# Patient Record
Sex: FEMALE | Race: WHITE | Employment: OTHER | URBAN - METROPOLITAN AREA
[De-identification: names, ages, dates, MRNs, and addresses within clinical notes are randomized per-mention and may not be internally consistent; named-entity substitution may affect disease eponyms.]

---

## 2017-01-20 PROBLEM — K21.9 LPRD (LARYNGOPHARYNGEAL REFLUX DISEASE): Status: ACTIVE | Noted: 2017-01-20

## 2017-01-20 PROBLEM — R19.8 GLOBUS PHARYNGEUS: Status: ACTIVE | Noted: 2017-01-20

## 2017-03-29 PROBLEM — K21.9 LPRD (LARYNGOPHARYNGEAL REFLUX DISEASE): Status: RESOLVED | Noted: 2017-01-20 | Resolved: 2017-03-29

## 2017-03-29 PROBLEM — R19.8 GLOBUS PHARYNGEUS: Status: RESOLVED | Noted: 2017-01-20 | Resolved: 2017-03-29

## 2017-04-05 PROCEDURE — 82570 ASSAY OF URINE CREATININE: CPT | Performed by: INTERNAL MEDICINE

## 2017-04-05 PROCEDURE — 82043 UR ALBUMIN QUANTITATIVE: CPT | Performed by: INTERNAL MEDICINE

## 2017-04-11 ENCOUNTER — APPOINTMENT (OUTPATIENT)
Dept: CT IMAGING | Age: 82
End: 2017-04-11
Attending: EMERGENCY MEDICINE
Payer: MEDICARE

## 2017-04-11 ENCOUNTER — HOSPITAL ENCOUNTER (EMERGENCY)
Age: 82
Discharge: HOME | End: 2017-04-11
Attending: EMERGENCY MEDICINE
Payer: MEDICARE

## 2017-04-11 ENCOUNTER — APPOINTMENT (OUTPATIENT)
Dept: GENERAL RADIOLOGY | Age: 82
End: 2017-04-11
Attending: EMERGENCY MEDICINE
Payer: MEDICARE

## 2017-04-11 VITALS
WEIGHT: 155 LBS | DIASTOLIC BLOOD PRESSURE: 57 MMHG | SYSTOLIC BLOOD PRESSURE: 123 MMHG | RESPIRATION RATE: 20 BRPM | BODY MASS INDEX: 29.27 KG/M2 | OXYGEN SATURATION: 100 % | TEMPERATURE: 96 F | HEART RATE: 85 BPM | HEIGHT: 61 IN

## 2017-04-11 DIAGNOSIS — S09.90XA MINOR HEAD INJURY, INITIAL ENCOUNTER: Primary | ICD-10-CM

## 2017-04-11 DIAGNOSIS — J98.01 ACUTE BRONCHOSPASM: ICD-10-CM

## 2017-04-11 PROCEDURE — 71010 XR CHEST AP PORTABLE  (CPT=71010): CPT | Performed by: RADIOLOGY

## 2017-04-11 PROCEDURE — 94640 AIRWAY INHALATION TREATMENT: CPT

## 2017-04-11 PROCEDURE — 99285 EMERGENCY DEPT VISIT HI MDM: CPT

## 2017-04-11 PROCEDURE — 80053 COMPREHEN METABOLIC PANEL: CPT | Performed by: EMERGENCY MEDICINE

## 2017-04-11 PROCEDURE — 36415 COLL VENOUS BLD VENIPUNCTURE: CPT

## 2017-04-11 PROCEDURE — 85025 COMPLETE CBC W/AUTO DIFF WBC: CPT | Performed by: EMERGENCY MEDICINE

## 2017-04-11 PROCEDURE — 93010 ELECTROCARDIOGRAM REPORT: CPT

## 2017-04-11 PROCEDURE — 93005 ELECTROCARDIOGRAM TRACING: CPT

## 2017-04-11 PROCEDURE — 70450 CT HEAD/BRAIN W/O DYE: CPT

## 2017-04-11 PROCEDURE — 84484 ASSAY OF TROPONIN QUANT: CPT | Performed by: EMERGENCY MEDICINE

## 2017-04-11 RX ORDER — ALBUTEROL SULFATE 90 UG/1
2 AEROSOL, METERED RESPIRATORY (INHALATION) EVERY 4 HOURS PRN
Qty: 1 INHALER | Refills: 0 | Status: SHIPPED | OUTPATIENT
Start: 2017-04-11 | End: 2017-04-20

## 2017-04-11 RX ORDER — IPRATROPIUM BROMIDE AND ALBUTEROL SULFATE 2.5; .5 MG/3ML; MG/3ML
3 SOLUTION RESPIRATORY (INHALATION) ONCE
Status: COMPLETED | OUTPATIENT
Start: 2017-04-11 | End: 2017-04-11

## 2017-04-12 NOTE — ED PROVIDER NOTES
Patient Seen in: THE [facility] Emergency Department In [place]    History   Patient presents with:  Head Neck Injury (neurologic, musculoskeletal)    Stated Complaint: Hit Head Sunday-PMD sent here for r/o concussion.      HPI    25-year-old female presents t OTHER SURGICAL HISTORY  8-5-2011    Comment Cysto / Flow us - Dr. Abhijit Gibson       Medications :   Albuterol Sulfate  (90 Base) MCG/ACT Inhalation Aero Soln,  Inhale 2 puffs into the lungs every 4 (four) hours as needed for Wheezing.    ESCITALOPRAM 20 Alcohol Use: Yes           0.0 oz/week       0 Standard drinks or equivalent per week       Comment: 2 beers per week      Review of Systems   All other systems reviewed and are negative.       Positive for stated complaint: Hit Head Sunday-PMD sent here ED Course     Labs Reviewed   COMP METABOLIC PANEL (14) - Abnormal; Notable for the following:     Glucose 126 (*)     All other components within normal limits   TROPONIN I - Normal   CBC WITH DIFFERENTIAL WITH PLATELET    Narrative:      The foll Schedule an appointment as soon as possible for a visit        Medications Prescribed:  Current Discharge Medication List    START taking these medications    ! !  Albuterol Sulfate  (90 Base) MCG/ACT Inhalation Aero Soln  Inhale 2 puffs into the lung

## 2017-10-13 PROCEDURE — 82570 ASSAY OF URINE CREATININE: CPT | Performed by: INTERNAL MEDICINE

## 2017-10-13 PROCEDURE — 82043 UR ALBUMIN QUANTITATIVE: CPT | Performed by: INTERNAL MEDICINE

## 2017-11-18 ENCOUNTER — HOSPITAL ENCOUNTER (OUTPATIENT)
Age: 82
Discharge: HOME | End: 2017-11-18
Attending: FAMILY MEDICINE
Payer: MEDICARE

## 2017-11-18 VITALS
OXYGEN SATURATION: 95 % | HEART RATE: 83 BPM | DIASTOLIC BLOOD PRESSURE: 82 MMHG | TEMPERATURE: 99 F | SYSTOLIC BLOOD PRESSURE: 117 MMHG | RESPIRATION RATE: 20 BRPM

## 2017-11-18 DIAGNOSIS — J01.00 ACUTE NON-RECURRENT MAXILLARY SINUSITIS: Primary | ICD-10-CM

## 2017-11-18 PROCEDURE — 99213 OFFICE O/P EST LOW 20 MIN: CPT

## 2017-11-18 PROCEDURE — 99214 OFFICE O/P EST MOD 30 MIN: CPT

## 2017-11-18 RX ORDER — AZELASTINE HCL 205.5 UG/1
SPRAY NASAL
Qty: 1 EACH | Refills: 0 | Status: SHIPPED | OUTPATIENT
Start: 2017-11-18 | End: 2018-03-16

## 2017-11-18 RX ORDER — CEFDINIR 300 MG/1
300 CAPSULE ORAL 2 TIMES DAILY
Qty: 20 CAPSULE | Refills: 0 | Status: SHIPPED | OUTPATIENT
Start: 2017-11-18 | End: 2018-02-14

## 2017-11-18 NOTE — ED PROVIDER NOTES
Patient Seen in: THE [facility]mediate Care In [facility]    History   Patient presents with:  Sinus Problem    Stated Complaint: SINUS INFECTION X 1 WK    HPI    This 51-year-old female presents to the office with a two-week history of worsening sinus conge negative except as noted above.     Physical Exam   ED Triage Vitals [11/18/17 1501]  BP: 117/82  Pulse: 83  Resp: 20  Temp: 98.6 °F (37 °C)  Temp src: Oral  SpO2: 95 %  O2 Device: None (Room air)    Current:/82   Pulse 83   Temp 98.6 °F (37 °C) (Oral aware of patient's Amoxil allergy.   Associated Diagnoses:Acute non-recurrent maxillary sinusitis    Azelastine HCl (ASTEPRO) 0.15 % Nasal Solution  Use 2 sprays each nostril once daily after shower for nasal/sinus congestion  Qty: 1 each Refills: 0  Associ

## 2017-11-18 NOTE — ED INITIAL ASSESSMENT (HPI)
Pt c/o thick post nasal drip \"That I can cut with my teeth\"  C/o sore throat from drainage. Clearing throat. Some intermittent plugged ears.

## 2018-02-14 PROBLEM — J41.0 SIMPLE CHRONIC BRONCHITIS (HCC): Status: ACTIVE | Noted: 2018-02-14

## 2018-03-16 ENCOUNTER — HOSPITAL ENCOUNTER (OUTPATIENT)
Age: 83
Discharge: HOME | End: 2018-03-16
Attending: FAMILY MEDICINE
Payer: MEDICARE

## 2018-03-16 VITALS
HEART RATE: 79 BPM | SYSTOLIC BLOOD PRESSURE: 131 MMHG | DIASTOLIC BLOOD PRESSURE: 69 MMHG | TEMPERATURE: 98 F | RESPIRATION RATE: 20 BRPM | OXYGEN SATURATION: 96 %

## 2018-03-16 DIAGNOSIS — J31.0 CHRONIC RHINITIS: ICD-10-CM

## 2018-03-16 DIAGNOSIS — R05.3 CHRONIC COUGH: Primary | ICD-10-CM

## 2018-03-16 PROCEDURE — 99213 OFFICE O/P EST LOW 20 MIN: CPT

## 2018-03-16 PROCEDURE — 99214 OFFICE O/P EST MOD 30 MIN: CPT

## 2018-03-16 RX ORDER — FLUTICASONE PROPIONATE 50 MCG
SPRAY, SUSPENSION (ML) NASAL
Qty: 1 INHALER | Refills: 0 | Status: SHIPPED | OUTPATIENT
Start: 2018-03-16 | End: 2018-06-05

## 2018-03-16 RX ORDER — AZELASTINE HCL 205.5 UG/1
SPRAY NASAL
Qty: 1 EACH | Refills: 0 | Status: SHIPPED | OUTPATIENT
Start: 2018-03-16 | End: 2018-06-21

## 2018-03-16 RX ORDER — BENZONATATE 200 MG/1
200 CAPSULE ORAL 3 TIMES DAILY PRN
Qty: 30 CAPSULE | Refills: 0 | Status: SHIPPED | OUTPATIENT
Start: 2018-03-16 | End: 2018-06-07 | Stop reason: ALTCHOICE

## 2018-03-16 NOTE — ED INITIAL ASSESSMENT (HPI)
Cough-   Pt has chronic cough. Pt saw and  ENT  Pt was on omeprazole and   PCP  changed to zantac  on feb 14 pr was prescribed with. Coughing  All night and wheezing . Pt has been taking her inhaler, yesterday, but none  Today.

## 2018-03-16 NOTE — ED PROVIDER NOTES
Patient Seen in: [facility] In [facility]    History   Patient presents with:  Cough    Stated Complaint: stomach,throat & sinus 3 wks    HPI    This 63-year-old female is brought to the office by her friend for evaluation of chronic sinus co incontinence        Past Surgical History:  No date: CHOLECYSTECTOMY  2008: COLONOSCOPY  No date: HYSTERECTOMY  No date: OTHER SURGICAL HISTORY      Comment: bladder raised  8-5-2011: OTHER SURGICAL HISTORY      Comment: Cysto / Flow us - Dr. Dafne Mercado  No d 1436  ------------------------------------------------------------       MDM     Extensive discussion with the patient and her friend regarding causes a cough.   I suspect that the patient's cough is multifactorial.  She has evidence of chronic postnasal dr the Flonase which is fluticasone nasal spray 2 sprays to each nostril once daily after shower. Restart the Astepro which is is a lasting nasal spray 2 sprays once daily. If you use your Flonase in the morning than take the Astepro at nighttime.   Take the

## 2018-07-24 ENCOUNTER — HOSPITAL ENCOUNTER (OUTPATIENT)
Age: 83
Discharge: HOME | End: 2018-07-24
Attending: FAMILY MEDICINE
Payer: MEDICARE

## 2018-07-24 VITALS
WEIGHT: 160 LBS | HEIGHT: 63 IN | TEMPERATURE: 98 F | RESPIRATION RATE: 20 BRPM | DIASTOLIC BLOOD PRESSURE: 74 MMHG | BODY MASS INDEX: 28.35 KG/M2 | SYSTOLIC BLOOD PRESSURE: 152 MMHG | HEART RATE: 72 BPM | OXYGEN SATURATION: 98 %

## 2018-07-24 DIAGNOSIS — J98.01 ACUTE BRONCHOSPASM: Primary | ICD-10-CM

## 2018-07-24 PROCEDURE — 99214 OFFICE O/P EST MOD 30 MIN: CPT

## 2018-07-24 PROCEDURE — 94640 AIRWAY INHALATION TREATMENT: CPT

## 2018-07-24 RX ORDER — IPRATROPIUM BROMIDE AND ALBUTEROL SULFATE 2.5; .5 MG/3ML; MG/3ML
3 SOLUTION RESPIRATORY (INHALATION) ONCE
Status: COMPLETED | OUTPATIENT
Start: 2018-07-24 | End: 2018-07-24

## 2018-07-24 NOTE — ED INITIAL ASSESSMENT (HPI)
Wheezing- x 2 days. Pt used inhaler the day before x1 . Pt woke up wheezing. Pt has chronic cough with phlegm . Took zantac this morning. denies fever.  Pt has h/o copd

## 2018-07-24 NOTE — ED PROVIDER NOTES
Patient Seen in: THE [facility]mediate Care In [facility]    History   Patient presents with:  Wheezing    Stated Complaint: Wheezing    HPI    This 49-year-old female presents to the office with complaint of wheezing.   The patient has had issues with bronch oz/week     Comment: 2 beers per week      Review of Systems    Positive for stated complaint: Wheezing  Other systems are as noted in HPI. Constitutional and vital signs reviewed. All other systems reviewed and negative except as noted above.     Matt worsening symptoms.         Disposition and Plan     Clinical Impression:  Acute bronchospasm  (primary encounter diagnosis)    Disposition:  Discharge  7/24/2018  1:54 pm    Follow-up:  Yong Can MD  [address] [place] [address]

## 2018-11-08 ENCOUNTER — CHARTING TRANS (OUTPATIENT)
Dept: OTHER | Age: 83
End: 2018-11-08

## 2018-11-08 PROBLEM — K21.9 LARYNGOPHARYNGEAL REFLUX (LPR): Status: ACTIVE | Noted: 2017-01-20

## 2018-11-08 PROBLEM — R09.82 POST-NASAL DRAINAGE: Status: ACTIVE | Noted: 2018-11-08

## 2018-12-12 PROCEDURE — 82043 UR ALBUMIN QUANTITATIVE: CPT | Performed by: INTERNAL MEDICINE

## 2018-12-12 PROCEDURE — 82570 ASSAY OF URINE CREATININE: CPT | Performed by: INTERNAL MEDICINE

## 2019-01-04 ENCOUNTER — HOSPITAL ENCOUNTER (OUTPATIENT)
Age: 84
Discharge: HOME | End: 2019-01-04
Attending: EMERGENCY MEDICINE
Payer: MEDICARE

## 2019-01-04 VITALS
SYSTOLIC BLOOD PRESSURE: 149 MMHG | TEMPERATURE: 97 F | RESPIRATION RATE: 21 BRPM | OXYGEN SATURATION: 95 % | DIASTOLIC BLOOD PRESSURE: 92 MMHG | HEART RATE: 93 BPM

## 2019-01-04 DIAGNOSIS — J20.9 ACUTE BRONCHITIS, UNSPECIFIED ORGANISM: Primary | ICD-10-CM

## 2019-01-04 PROCEDURE — 99214 OFFICE O/P EST MOD 30 MIN: CPT

## 2019-01-04 PROCEDURE — 99213 OFFICE O/P EST LOW 20 MIN: CPT

## 2019-01-04 RX ORDER — AZITHROMYCIN 250 MG/1
TABLET, FILM COATED ORAL
Qty: 1 PACKAGE | Refills: 0 | Status: SHIPPED | OUTPATIENT
Start: 2019-01-04 | End: 2019-01-10

## 2019-01-04 NOTE — ED INITIAL ASSESSMENT (HPI)
Pt here c/o cough for last week, states she has been wheezing for close to a week. Used inhaler last night. Denies fever.

## 2019-01-04 NOTE — ED PROVIDER NOTES
Patient Seen in: THE [facility] In [facility]    History   Patient presents with:  Cough/URI    Stated Complaint: wheezing,cough     HPI    This is an 42-year-old white female complaining of cough the patient's had a cough with some mild shortnes BP (!) 149/92   Pulse 93   Resp 21   Temp 97.3 °F (36.3 °C)   Temp src Temporal   SpO2 95 %   O2 Device None (Room air)       Current:BP (!) 149/92   Pulse 93   Temp 97.3 °F (36.3 °C) (Temporal)   Resp 21   SpO2 95%         Physical Exam    Patient is al

## 2019-02-05 ENCOUNTER — TELEPHONE (OUTPATIENT)
Dept: SCHEDULING | Age: 84
End: 2019-02-05

## 2019-03-05 PROCEDURE — 88377 M/PHMTRC ALYS ISHQUANT/SEMIQ: CPT | Performed by: RADIOLOGY

## 2019-03-05 PROCEDURE — 88360 TUMOR IMMUNOHISTOCHEM/MANUAL: CPT | Performed by: RADIOLOGY

## 2019-03-05 PROCEDURE — 88305 TISSUE EXAM BY PATHOLOGIST: CPT | Performed by: RADIOLOGY

## 2019-03-14 PROBLEM — C50.411 MALIGNANT NEOPLASM OF UPPER-OUTER QUADRANT OF RIGHT BREAST IN FEMALE, ESTROGEN RECEPTOR POSITIVE (HCC): Status: ACTIVE | Noted: 2019-03-14

## 2019-03-14 PROBLEM — Z17.0 MALIGNANT NEOPLASM OF UPPER-OUTER QUADRANT OF RIGHT BREAST IN FEMALE, ESTROGEN RECEPTOR POSITIVE (HCC): Status: ACTIVE | Noted: 2019-03-14

## 2019-03-30 ENCOUNTER — APPOINTMENT (OUTPATIENT)
Dept: GENERAL RADIOLOGY | Age: 84
End: 2019-03-30
Attending: PHYSICIAN ASSISTANT
Payer: MEDICARE

## 2019-03-30 ENCOUNTER — HOSPITAL ENCOUNTER (OUTPATIENT)
Age: 84
Discharge: HOME | End: 2019-03-30
Payer: MEDICARE

## 2019-03-30 VITALS
RESPIRATION RATE: 14 BRPM | OXYGEN SATURATION: 97 % | TEMPERATURE: 97 F | WEIGHT: 160 LBS | HEART RATE: 75 BPM | SYSTOLIC BLOOD PRESSURE: 135 MMHG | DIASTOLIC BLOOD PRESSURE: 71 MMHG | BODY MASS INDEX: 30 KG/M2

## 2019-03-30 DIAGNOSIS — J01.00 ACUTE NON-RECURRENT MAXILLARY SINUSITIS: Primary | ICD-10-CM

## 2019-03-30 DIAGNOSIS — J20.9 ACUTE BRONCHITIS, UNSPECIFIED ORGANISM: ICD-10-CM

## 2019-03-30 PROCEDURE — 71046 X-RAY EXAM CHEST 2 VIEWS: CPT | Performed by: PHYSICIAN ASSISTANT

## 2019-03-30 PROCEDURE — 99214 OFFICE O/P EST MOD 30 MIN: CPT

## 2019-03-30 PROCEDURE — 99213 OFFICE O/P EST LOW 20 MIN: CPT

## 2019-03-30 RX ORDER — METHYLPREDNISOLONE 4 MG/1
TABLET ORAL
Qty: 1 PACKAGE | Refills: 0 | Status: SHIPPED | OUTPATIENT
Start: 2019-03-30 | End: 2019-05-07

## 2019-03-30 RX ORDER — BENZONATATE 100 MG/1
100 CAPSULE ORAL 3 TIMES DAILY PRN
Qty: 30 CAPSULE | Refills: 0 | Status: SHIPPED | OUTPATIENT
Start: 2019-03-30 | End: 2019-04-29

## 2019-03-30 RX ORDER — AZITHROMYCIN 250 MG/1
TABLET, FILM COATED ORAL
Qty: 1 PACKAGE | Refills: 0 | Status: SHIPPED | OUTPATIENT
Start: 2019-03-30 | End: 2019-04-04

## 2019-03-30 RX ORDER — ALBUTEROL SULFATE 90 UG/1
2 AEROSOL, METERED RESPIRATORY (INHALATION) EVERY 4 HOURS PRN
Qty: 1 INHALER | Refills: 0 | Status: SHIPPED | OUTPATIENT
Start: 2019-03-30 | End: 2019-04-29

## 2019-03-30 NOTE — ED INITIAL ASSESSMENT (HPI)
Patient reports she doesn't feel well. Patient reports she is coughing up clear sticky phlegm. Patient reports she has been coughing for about 1 week, but it tends to come and go. Patient reports she has been here before for this same complaint.

## 2019-03-30 NOTE — ED PROVIDER NOTES
Patient Seen in: [facility] In [place]    History   Patient presents with:  Cough/URI    Stated Complaint: COUGH/SHORT OF BREATH    HPI  Jefferson Franco is an 15-year-old female who presents today for evaluation of cough, congestion and shortness of OVER 10 YRS AGO    SLING   • TOTAL ABDOM HYSTERECTOMY         Family history reviewed and is not pertinent to presenting problem.     Social History    Tobacco Use      Smoking status: Never Smoker      Smokeless tobacco: Never Used      Tobacco comment: Pe person, place, and time. No cranial nerve deficit. Skin: She is not diaphoretic. Nursing note and vitals reviewed.                                                                                                  ED Course   Labs Reviewed - No data to [employer] Additional verbal discharge instructions are given and discussed. Discharge medications are discussed. The patient is in good condition throughout the visit today and remains so upon discharge.   I discuss the plan of care with the patient, who expresses u

## 2019-04-04 PROCEDURE — 36415 COLL VENOUS BLD VENIPUNCTURE: CPT | Performed by: INTERNAL MEDICINE

## 2019-04-04 PROCEDURE — 88184 FLOWCYTOMETRY/ TC 1 MARKER: CPT | Performed by: INTERNAL MEDICINE

## 2019-04-04 PROCEDURE — 88307 TISSUE EXAM BY PATHOLOGIST: CPT | Performed by: INTERNAL MEDICINE

## 2019-04-04 PROCEDURE — 88185 FLOWCYTOMETRY/TC ADD-ON: CPT | Performed by: INTERNAL MEDICINE

## 2019-04-04 PROCEDURE — 88342 IMHCHEM/IMCYTCHM 1ST ANTB: CPT | Performed by: INTERNAL MEDICINE

## 2019-04-04 PROCEDURE — 88321 CONSLTJ&REPRT SLD PREP ELSWR: CPT | Performed by: INTERNAL MEDICINE

## 2019-04-04 PROCEDURE — 88341 IMHCHEM/IMCYTCHM EA ADD ANTB: CPT | Performed by: INTERNAL MEDICINE

## 2019-07-28 ENCOUNTER — HOSPITAL ENCOUNTER (OUTPATIENT)
Age: 84
Discharge: HOME | End: 2019-07-28
Attending: FAMILY MEDICINE
Payer: MEDICARE

## 2019-07-28 VITALS
DIASTOLIC BLOOD PRESSURE: 73 MMHG | HEART RATE: 92 BPM | OXYGEN SATURATION: 96 % | TEMPERATURE: 98 F | RESPIRATION RATE: 16 BRPM | SYSTOLIC BLOOD PRESSURE: 145 MMHG

## 2019-07-28 DIAGNOSIS — J30.2 SEASONAL ALLERGIC RHINITIS, UNSPECIFIED TRIGGER: Primary | ICD-10-CM

## 2019-07-28 PROCEDURE — 99213 OFFICE O/P EST LOW 20 MIN: CPT

## 2019-07-28 PROCEDURE — 99212 OFFICE O/P EST SF 10 MIN: CPT

## 2019-07-28 RX ORDER — FLUTICASONE PROPIONATE 50 MCG
SPRAY, SUSPENSION (ML) NASAL
Qty: 1 INHALER | Refills: 0 | Status: SHIPPED | OUTPATIENT
Start: 2019-07-28 | End: 2020-02-05

## 2019-07-28 NOTE — ED PROVIDER NOTES
Patient Seen in: [facility]    History   Patient presents with:  Cough/URI    Stated Complaint: cough    HPI    This 29-year-old female presents the office with complaint of persistent cough, postnasal drip.   She is coughing up Drug use: Yes      Review of Systems    Positive for stated complaint: cough  Other systems are as noted in HPI. Constitutional and vital signs reviewed. All other systems reviewed and negative except as noted above.     Physical Exam     ED Triage Vi have the nursing staff contact the  at the hospital who will contact the  affiliated at the cancer center so that she can make contact with the patient to help provide resources.     Disposition and Plan     Clinical Impression:  Se

## 2019-07-28 NOTE — ED INITIAL ASSESSMENT (HPI)
Presents with complains of a \" clear thick mucous\". Denies fever. Denies dyspnea. Patient complains of nasal congestion and post nasal drip. Color pink. Ski warm and dry.

## 2019-08-23 PROCEDURE — 87086 URINE CULTURE/COLONY COUNT: CPT | Performed by: PHYSICIAN ASSISTANT

## 2019-08-23 PROCEDURE — 87147 CULTURE TYPE IMMUNOLOGIC: CPT | Performed by: PHYSICIAN ASSISTANT

## 2020-01-02 PROBLEM — F32.A MILD DEPRESSION: Status: ACTIVE | Noted: 2020-01-02

## 2020-01-02 PROBLEM — Z91.09 ENVIRONMENTAL ALLERGIES: Status: ACTIVE | Noted: 2020-01-02

## 2020-01-02 PROBLEM — R53.81 PHYSICAL DEBILITY: Status: ACTIVE | Noted: 2020-01-02

## 2020-01-02 PROBLEM — R09.82 PND (POST-NASAL DRIP): Status: ACTIVE | Noted: 2018-11-08

## 2020-01-26 ENCOUNTER — HOSPITAL ENCOUNTER (OUTPATIENT)
Age: 85
Discharge: HOME | End: 2020-01-26
Attending: FAMILY MEDICINE
Payer: MEDICARE

## 2020-01-26 VITALS
OXYGEN SATURATION: 95 % | RESPIRATION RATE: 18 BRPM | HEART RATE: 96 BPM | DIASTOLIC BLOOD PRESSURE: 75 MMHG | SYSTOLIC BLOOD PRESSURE: 162 MMHG | TEMPERATURE: 98 F

## 2020-01-26 DIAGNOSIS — J34.89 SINUS DRAINAGE: Primary | ICD-10-CM

## 2020-01-26 DIAGNOSIS — J98.01 ACUTE BRONCHOSPASM: ICD-10-CM

## 2020-01-26 PROCEDURE — 99214 OFFICE O/P EST MOD 30 MIN: CPT

## 2020-01-26 PROCEDURE — 94640 AIRWAY INHALATION TREATMENT: CPT

## 2020-01-26 RX ORDER — ALBUTEROL SULFATE 90 UG/1
2 AEROSOL, METERED RESPIRATORY (INHALATION) EVERY 4 HOURS PRN
Qty: 1 INHALER | Refills: 0 | Status: SHIPPED | OUTPATIENT
Start: 2020-01-26 | End: 2021-03-22 | Stop reason: ALTCHOICE

## 2020-01-26 RX ORDER — IPRATROPIUM BROMIDE 21 UG/1
2 SPRAY, METERED NASAL EVERY 12 HOURS
Qty: 1 BOTTLE | Refills: 0 | Status: SHIPPED | OUTPATIENT
Start: 2020-01-26 | End: 2020-03-08 | Stop reason: ALTCHOICE

## 2020-01-26 RX ORDER — IPRATROPIUM BROMIDE AND ALBUTEROL SULFATE 2.5; .5 MG/3ML; MG/3ML
3 SOLUTION RESPIRATORY (INHALATION) ONCE
Status: COMPLETED | OUTPATIENT
Start: 2020-01-26 | End: 2020-01-26

## 2020-01-26 NOTE — ED PROVIDER NOTES
Patient Seen in: [facility] In [facility]      History   Patient presents with:  Sinus Problem    Stated Complaint: SINUS ISSUES/STOMACH PAIN    HPI    This 30-year-old female presents the office with complaint of chronic sinus congestion, po problem. Social History    Tobacco Use      Smoking status: Never Smoker      Smokeless tobacco: Never Used      Tobacco comment: Per pt used to live with a smoker until 1995. Alcohol use:  Yes      Alcohol/week: 0.0 standard drinks      Comment: 2 be who drives her to the store. The patient no longer drives. She has questions about how she could be downsizing to a smaller place. She has a very dysfunctional relationship with her 1 daughter who lives in [place] and they fight.   The patient does no for any new or worsening symptoms.

## 2020-01-26 NOTE — CM/SW NOTE
Fact sheet and contact info sent to Gus Gutierrez at [facility]

## 2020-03-08 ENCOUNTER — APPOINTMENT (OUTPATIENT)
Dept: GENERAL RADIOLOGY | Age: 85
End: 2020-03-08
Attending: EMERGENCY MEDICINE
Payer: MEDICARE

## 2020-03-08 ENCOUNTER — HOSPITAL ENCOUNTER (EMERGENCY)
Age: 85
Discharge: HOME | End: 2020-03-08
Attending: EMERGENCY MEDICINE
Payer: MEDICARE

## 2020-03-08 ENCOUNTER — APPOINTMENT (OUTPATIENT)
Dept: CT IMAGING | Age: 85
End: 2020-03-08
Attending: EMERGENCY MEDICINE
Payer: MEDICARE

## 2020-03-08 ENCOUNTER — APPOINTMENT (OUTPATIENT)
Dept: GENERAL RADIOLOGY | Age: 85
End: 2020-03-08
Attending: NURSE PRACTITIONER
Payer: MEDICARE

## 2020-03-08 ENCOUNTER — HOSPITAL ENCOUNTER (OUTPATIENT)
Age: 85
Discharge: ANOTHER HOSPITAL | End: 2020-03-08
Payer: MEDICARE

## 2020-03-08 VITALS
TEMPERATURE: 98 F | DIASTOLIC BLOOD PRESSURE: 52 MMHG | HEART RATE: 103 BPM | HEIGHT: 61 IN | WEIGHT: 161 LBS | SYSTOLIC BLOOD PRESSURE: 130 MMHG | OXYGEN SATURATION: 94 % | RESPIRATION RATE: 16 BRPM | BODY MASS INDEX: 30.4 KG/M2

## 2020-03-08 VITALS
DIASTOLIC BLOOD PRESSURE: 54 MMHG | SYSTOLIC BLOOD PRESSURE: 117 MMHG | BODY MASS INDEX: 30 KG/M2 | RESPIRATION RATE: 16 BRPM | TEMPERATURE: 98 F | OXYGEN SATURATION: 96 % | WEIGHT: 160.94 LBS | HEART RATE: 68 BPM

## 2020-03-08 DIAGNOSIS — M54.2 CERVICAL PAIN: ICD-10-CM

## 2020-03-08 DIAGNOSIS — M25.511 ACUTE SHOULDER PAIN DUE TO TRAUMA, RIGHT: ICD-10-CM

## 2020-03-08 DIAGNOSIS — G89.11 ACUTE SHOULDER PAIN DUE TO TRAUMA, RIGHT: ICD-10-CM

## 2020-03-08 DIAGNOSIS — S20.221A CONTUSION OF RIGHT SIDE OF BACK, INITIAL ENCOUNTER: ICD-10-CM

## 2020-03-08 DIAGNOSIS — S69.91XA INJURY OF RIGHT WRIST, INITIAL ENCOUNTER: ICD-10-CM

## 2020-03-08 DIAGNOSIS — M79.18 RIGHT BUTTOCK PAIN: ICD-10-CM

## 2020-03-08 DIAGNOSIS — S62.101A CLOSED FRACTURE OF RIGHT WRIST, INITIAL ENCOUNTER: Primary | ICD-10-CM

## 2020-03-08 DIAGNOSIS — S46.911A STRAIN OF RIGHT SHOULDER, INITIAL ENCOUNTER: ICD-10-CM
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
## 2020-03-08 DIAGNOSIS — S70.01XA CONTUSION OF RIGHT HIP, INITIAL ENCOUNTER: ICD-10-CM

## 2020-03-08 DIAGNOSIS — S00.03XA CONTUSION OF SCALP, INITIAL ENCOUNTER: ICD-10-CM

## 2020-03-08 PROCEDURE — 29125 APPL SHORT ARM SPLINT STATIC: CPT

## 2020-03-08 PROCEDURE — 73502 X-RAY EXAM HIP UNI 2-3 VIEWS: CPT | Performed by: EMERGENCY MEDICINE

## 2020-03-08 PROCEDURE — 72110 X-RAY EXAM L-2 SPINE 4/>VWS: CPT | Performed by: EMERGENCY MEDICINE

## 2020-03-08 PROCEDURE — 72125 CT NECK SPINE W/O DYE: CPT | Performed by: EMERGENCY MEDICINE

## 2020-03-08 PROCEDURE — 99285 EMERGENCY DEPT VISIT HI MDM: CPT

## 2020-03-08 PROCEDURE — 73110 X-RAY EXAM OF WRIST: CPT | Performed by: NURSE PRACTITIONER

## 2020-03-08 PROCEDURE — 99213 OFFICE O/P EST LOW 20 MIN: CPT

## 2020-03-08 PROCEDURE — 73030 X-RAY EXAM OF SHOULDER: CPT | Performed by: EMERGENCY MEDICINE

## 2020-03-08 PROCEDURE — 70450 CT HEAD/BRAIN W/O DYE: CPT | Performed by: EMERGENCY MEDICINE

## 2020-03-08 PROCEDURE — 73130 X-RAY EXAM OF HAND: CPT | Performed by: EMERGENCY MEDICINE

## 2020-03-08 NOTE — ED PROVIDER NOTES
Patient Seen in: [facility] In [place]      History   Patient presents with:  Fall    Stated Complaint: fall yesterday hit head denies loc     HPI    27-year-old white female who presents to the emergency room today for complaint of f Per pt used to live with a smoker until 1995. Alcohol use: Yes      Alcohol/week: 0.0 standard drinks      Comment: 2 beers per week    Drug use:  Yes             Review of Systems    Positive for stated complaint: fall yesterday hit head denies loc   Ot motion. She has no distal femur tenderness noted. Patient can dorsiflex and plantarflex the ankle without difficulty. She has a good dorsalis pedis pulse noted.   Patient has good range of motion of the right shoulder with pain to palpation along the lat change of the spine noted. A bone island projects over the right aspect of the sacrum. SOFT TISSUES:  Pelvic phleboliths noted. EFFUSION:  None visible. OTHER:  Negative. CONCLUSION:  No acute fracture.     X-ray study of the lumbar spine revealed: was good position she was neurovascular intact. The rest the x-rays are unremarkable except for some arthritis. CT scan the head and neck were negative. Patient will be discharged home to follow-up with orthopedic surgery.   She is advised ice and elevat

## 2020-03-08 NOTE — ED PROVIDER NOTES
Patient Seen in: [facility] In [facility]      History   Patient presents with:  Fall  Wrist Injury    Stated Complaint: RIGHT HAND/WRIST SPRAIN, FELL AT HOME HIT HEAD    HPI  Patient is an 60-year-old female past medical history of hypertens incontinence               Past Surgical History:   Procedure Laterality Date   • CHOLECYSTECTOMY     • COLONOSCOPY  2008   • HYSTERECTOMY     • NEEDLE BIOPSY RIGHT  03/2019    [facility]   • OTHER SURGICAL HISTORY      bladder raised   • OTHER SURGICAL HISTORY Tympanic membrane and external ear normal.      Nose: Nose normal. No nasal deformity, signs of injury or nasal tenderness. Left Nostril: No epistaxis. Mouth/Throat:      Mouth: Mucous membranes are dry. No injury. Tongue: No lesions.       P without ecchymosis or swelling    Lower extremity strength 5 out of 5 bilaterally. Lymphadenopathy:      Cervical: No cervical adenopathy. Skin:     General: Skin is warm and dry. Capillary Refill: Capillary refill takes less than 2 seconds. changes are seen at the lateral carpal articulations and at the radial carpal joint space. There is widening of the scapholunate joint space. On the oblique view, there is a subtle cortical irregularity at the anterior distal aspect of the right radius.

## 2020-03-08 NOTE — ED INITIAL ASSESSMENT (HPI)
States she was standing on a stool hanging a picture yesterday when she fell backwards landing on her right side. C/o right hip, lower back,right sided neck pain and head pain. Denies loss of consciousness.  Seen at urgent care and sent here for further hailey

## 2020-03-08 NOTE — ED INITIAL ASSESSMENT (HPI)
Pt presents today with c/o fall last night. Pt states that she was up on a stool trying to hang a clock when she fell backwards off the stool landing on her buttocks.  Pt states that she did fall backwards after landing on her buttocks and hit the back of h

## 2020-03-09 ENCOUNTER — HOSPITAL ENCOUNTER (EMERGENCY)
Facility: HOSPITAL | Age: 85
Discharge: HOME | End: 2020-03-09
Attending: EMERGENCY MEDICINE
Payer: MEDICARE

## 2020-03-09 ENCOUNTER — APPOINTMENT (OUTPATIENT)
Dept: GENERAL RADIOLOGY | Facility: HOSPITAL | Age: 85
End: 2020-03-09
Attending: EMERGENCY MEDICINE
Payer: MEDICARE

## 2020-03-09 VITALS
RESPIRATION RATE: 20 BRPM | SYSTOLIC BLOOD PRESSURE: 124 MMHG | WEIGHT: 160 LBS | BODY MASS INDEX: 30.21 KG/M2 | OXYGEN SATURATION: 93 % | HEIGHT: 61 IN | DIASTOLIC BLOOD PRESSURE: 65 MMHG | HEART RATE: 86 BPM | TEMPERATURE: 97 F

## 2020-03-09 DIAGNOSIS — S20.229A CONTUSION OF BACK, UNSPECIFIED LATERALITY, INITIAL ENCOUNTER: Primary | ICD-10-CM

## 2020-03-09 PROCEDURE — 96375 TX/PRO/DX INJ NEW DRUG ADDON: CPT | Performed by: EMERGENCY MEDICINE

## 2020-03-09 PROCEDURE — 99284 EMERGENCY DEPT VISIT MOD MDM: CPT | Performed by: EMERGENCY MEDICINE

## 2020-03-09 PROCEDURE — 72100 X-RAY EXAM L-S SPINE 2/3 VWS: CPT | Performed by: EMERGENCY MEDICINE

## 2020-03-09 PROCEDURE — 96374 THER/PROPH/DIAG INJ IV PUSH: CPT | Performed by: EMERGENCY MEDICINE

## 2020-03-09 PROCEDURE — 72170 X-RAY EXAM OF PELVIS: CPT | Performed by: EMERGENCY MEDICINE

## 2020-03-09 RX ORDER — ONDANSETRON 2 MG/ML
4 INJECTION INTRAMUSCULAR; INTRAVENOUS ONCE
Status: COMPLETED | OUTPATIENT
Start: 2020-03-09 | End: 2020-03-09

## 2020-03-09 RX ORDER — TRAMADOL HYDROCHLORIDE 50 MG/1
TABLET ORAL EVERY 6 HOURS PRN
Qty: 20 TABLET | Refills: 0 | Status: SHIPPED | OUTPATIENT
Start: 2020-03-09 | End: 2020-03-16

## 2020-03-09 RX ORDER — HYDROMORPHONE HYDROCHLORIDE 1 MG/ML
0.5 INJECTION, SOLUTION INTRAMUSCULAR; INTRAVENOUS; SUBCUTANEOUS ONCE
Status: COMPLETED | OUTPATIENT
Start: 2020-03-09 | End: 2020-03-09

## 2020-03-09 NOTE — ED NOTES
Pt denies dizziness prior to fall, daughter thinking she slid on paper on bathroom floor, c-collar in place, pt denies neck pain, pt fell back laning onto bottom, c/o low back pain, vss, a/ox2

## 2020-03-09 NOTE — ED PROVIDER NOTES
Patient Seen in: [facility] Emergency Department      History   Patient presents with:  Trauma    Stated Complaint: fall    HPI    59-year-old female comes to the emergency department today after a fall at home complaining of low back pain.   The pat Smoking status: Never Smoker      Smokeless tobacco: Never Used      Tobacco comment: Per pt used to live with a smoker until 1995. Alcohol use: Yes      Alcohol/week: 0.0 standard drinks      Comment: 2 beers per week    Drug use:  Yes             Revi process is appreciated. Dictated by: Ubaldo Dumont DO on 3/09/2020 at 3:58 PM       Finalized by: Ubaldo Dumont DO on 3/09/2020 at 4:00 PM          Lumbar spine films reveal the following findings:  CONCLUSION:    1. No acute process identified.   No

## 2020-03-09 NOTE — ED INITIAL ASSESSMENT (HPI)
Pt fell yesterday and broke wrist, pt fell again today with resulting back pain and neck pain, pt arrives via ems in c collar.

## 2021-01-21 PROBLEM — F03.90 MILD DEMENTIA (HCC): Status: ACTIVE | Noted: 2021-01-21

## 2021-02-19 PROBLEM — G30.8 ALZHEIMER'S DISEASE OF OTHER ONSET WITHOUT BEHAVIORAL DISTURBANCE: Status: ACTIVE | Noted: 2021-01-21

## 2021-02-19 PROBLEM — H90.3 SENSORINEURAL HEARING LOSS (SNHL) OF BOTH EARS: Status: ACTIVE | Noted: 2021-02-19

## 2021-02-19 PROBLEM — F02.80 ALZHEIMER'S DISEASE OF OTHER ONSET WITHOUT BEHAVIORAL DISTURBANCE: Status: ACTIVE | Noted: 2021-01-21

## 2021-03-16 ENCOUNTER — APPOINTMENT (OUTPATIENT)
Dept: GENERAL RADIOLOGY | Facility: HOSPITAL | Age: 86
End: 2021-03-16
Attending: EMERGENCY MEDICINE
Payer: MEDICARE

## 2021-03-16 ENCOUNTER — APPOINTMENT (OUTPATIENT)
Dept: CT IMAGING | Facility: HOSPITAL | Age: 86
End: 2021-03-16
Attending: EMERGENCY MEDICINE
Payer: MEDICARE

## 2021-03-16 ENCOUNTER — HOSPITAL ENCOUNTER (EMERGENCY)
Facility: HOSPITAL | Age: 86
Discharge: HOME | End: 2021-03-16
Attending: EMERGENCY MEDICINE
Payer: MEDICARE

## 2021-03-16 VITALS
SYSTOLIC BLOOD PRESSURE: 149 MMHG | DIASTOLIC BLOOD PRESSURE: 68 MMHG | TEMPERATURE: 98 F | HEART RATE: 72 BPM | RESPIRATION RATE: 24 BRPM | OXYGEN SATURATION: 93 % | WEIGHT: 165.38 LBS | BODY MASS INDEX: 31 KG/M2
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
## 2021-03-16 DIAGNOSIS — S93.402A SPRAIN OF LEFT ANKLE, UNSPECIFIED LIGAMENT, INITIAL ENCOUNTER: Primary | ICD-10-CM

## 2021-03-16 PROCEDURE — 99284 EMERGENCY DEPT VISIT MOD MDM: CPT

## 2021-03-16 PROCEDURE — 72125 CT NECK SPINE W/O DYE: CPT | Performed by: EMERGENCY MEDICINE

## 2021-03-16 PROCEDURE — 73610 X-RAY EXAM OF ANKLE: CPT | Performed by: EMERGENCY MEDICINE

## 2021-03-16 PROCEDURE — 70450 CT HEAD/BRAIN W/O DYE: CPT | Performed by: EMERGENCY MEDICINE

## 2021-03-16 NOTE — ED PROVIDER NOTES
Patient Seen in: [facility] Emergency Department      History   Patient presents with:  Leg or Foot Injury    Stated Complaint: ankle injury    HPI/Subjective:   HPI    Patient is an 80-year-old female who lives alone at home who presents after a fa SURGICAL HISTORY  8-5-2011    Cysto / Flow us - Dr. Jany Tanner   • REMOVAL GALLBLADDER     • SPECIAL SERVICE OR REPORT  OVER 10 YRS AGO    SLING   • TOTAL ABDOM HYSTERECTOMY                  Social History    Tobacco Use      Smoking status: Never Smoker Skin:     General: Skin is warm and dry. Neurological:      Mental Status: She is alert and oriented to person, place, and time.              ED Course   Labs Reviewed - No data to display       XR ANKLE (MIN 3 VIEWS), LEFT (CPT=73610)    Result Date: 3 ischemic change. There is no evidence of acute ischemia, hemorrhage or mass. SINUSES:           No sign of acute sinusitis. MASTOIDS:          No sign of acute inflammation. SKULL:             No evidence for fracture or osseous abnormality.  OTHER: foraminal stenosis. C5-C6:  There is facet hypertrophy. There is no osseous central canal or foraminal stenosis. C6-C7:  Facet hypertrophy and diffuse endplate osteophyte are noted without stenosis of central canal or foramina.  C7-T1:  No significant disc encounter diagnosis)  Fall, initial encounter    Disposition:  Discharge  3/16/2021  4:17 pm    Follow-up:  Callie Brooke MD  [address]  [phone]                Medications Prescribed:  Current Disch

## 2021-03-22 PROBLEM — C96.9: Status: ACTIVE | Noted: 2021-03-22

## 2021-04-13 ENCOUNTER — APPOINTMENT (OUTPATIENT)
Dept: CT IMAGING | Facility: HOSPITAL | Age: 86
DRG: 638 | End: 2021-04-13
Attending: EMERGENCY MEDICINE
Payer: MEDICARE

## 2021-04-13 ENCOUNTER — APPOINTMENT (OUTPATIENT)
Dept: GENERAL RADIOLOGY | Facility: HOSPITAL | Age: 86
DRG: 638 | End: 2021-04-13
Attending: EMERGENCY MEDICINE
Payer: MEDICARE

## 2021-04-13 ENCOUNTER — HOSPITAL ENCOUNTER (INPATIENT)
Facility: HOSPITAL | Age: 86
LOS: 11 days | Discharge: NURSING, REHAB OR LONG-TERM CARE | DRG: 638 | End: 2021-04-24
Attending: EMERGENCY MEDICINE | Admitting: INTERNAL MEDICINE
Payer: MEDICARE

## 2021-04-13 DIAGNOSIS — A41.9 SEPSIS SECONDARY TO UTI (HCC): ICD-10-CM

## 2021-04-13 DIAGNOSIS — Z71.89 GOALS OF CARE, COUNSELING/DISCUSSION: ICD-10-CM

## 2021-04-13 DIAGNOSIS — I10 ESSENTIAL HYPERTENSION, BENIGN: ICD-10-CM

## 2021-04-13 DIAGNOSIS — N39.0 SEPSIS SECONDARY TO UTI (HCC): ICD-10-CM

## 2021-04-13 DIAGNOSIS — M62.82 NON-TRAUMATIC RHABDOMYOLYSIS: ICD-10-CM

## 2021-04-13 DIAGNOSIS — N17.9 AKI (ACUTE KIDNEY INJURY) (HCC): ICD-10-CM

## 2021-04-13 DIAGNOSIS — D72.829 LEUKOCYTOSIS, UNSPECIFIED TYPE: ICD-10-CM

## 2021-04-13 DIAGNOSIS — E13.10 DIABETIC KETOACIDOSIS WITHOUT COMA ASSOCIATED WITH OTHER SPECIFIED DIABETES MELLITUS (HCC): Primary | ICD-10-CM

## 2021-04-13 PROBLEM — E11.10 DIABETIC ACIDOSIS WITHOUT COMA (HCC): Status: ACTIVE | Noted: 2021-04-13

## 2021-04-13 PROCEDURE — 71045 X-RAY EXAM CHEST 1 VIEW: CPT | Performed by: EMERGENCY MEDICINE

## 2021-04-13 PROCEDURE — 73523 X-RAY EXAM HIPS BI 5/> VIEWS: CPT | Performed by: EMERGENCY MEDICINE

## 2021-04-13 PROCEDURE — 73610 X-RAY EXAM OF ANKLE: CPT | Performed by: EMERGENCY MEDICINE

## 2021-04-13 PROCEDURE — 72125 CT NECK SPINE W/O DYE: CPT | Performed by: EMERGENCY MEDICINE

## 2021-04-13 PROCEDURE — 70450 CT HEAD/BRAIN W/O DYE: CPT | Performed by: EMERGENCY MEDICINE

## 2021-04-13 RX ORDER — ANASTROZOLE 1 MG/1
1 TABLET ORAL DAILY
Status: DISCONTINUED | OUTPATIENT
Start: 2021-04-13 | End: 2021-04-24

## 2021-04-13 RX ORDER — ESCITALOPRAM OXALATE 20 MG/1
20 TABLET ORAL DAILY
Status: DISCONTINUED | OUTPATIENT
Start: 2021-04-13 | End: 2021-04-24

## 2021-04-13 RX ORDER — INSULIN ASPART 100 [IU]/ML
0.2 INJECTION, SOLUTION INTRAVENOUS; SUBCUTANEOUS ONCE
Status: DISCONTINUED | OUTPATIENT
Start: 2021-04-13 | End: 2021-04-13

## 2021-04-13 RX ORDER — ANASTROZOLE 1 MG/1
1 TABLET ORAL DAILY
COMMUNITY

## 2021-04-13 RX ORDER — SODIUM CHLORIDE 9 MG/ML
INJECTION, SOLUTION INTRAVENOUS CONTINUOUS
Status: DISCONTINUED | OUTPATIENT
Start: 2021-04-13 | End: 2021-04-14

## 2021-04-13 RX ORDER — ALPRAZOLAM 1 MG/1
1 TABLET ORAL NIGHTLY PRN
Status: ON HOLD | COMMUNITY
Start: 2021-04-12 | End: 2021-06-01

## 2021-04-13 RX ORDER — ACETAMINOPHEN 325 MG/1
650 TABLET ORAL EVERY 6 HOURS PRN
Status: DISCONTINUED | OUTPATIENT
Start: 2021-04-13 | End: 2021-04-13

## 2021-04-13 RX ORDER — INSULIN ASPART 100 [IU]/ML
10 INJECTION, SOLUTION INTRAVENOUS; SUBCUTANEOUS ONCE
Status: COMPLETED | OUTPATIENT
Start: 2021-04-13 | End: 2021-04-13

## 2021-04-13 RX ORDER — ACETAMINOPHEN 325 MG/1
650 TABLET ORAL EVERY 4 HOURS PRN
Status: DISCONTINUED | OUTPATIENT
Start: 2021-04-13 | End: 2021-04-24

## 2021-04-13 RX ORDER — ACETAMINOPHEN 160 MG/5ML
650 SOLUTION ORAL EVERY 4 HOURS PRN
Status: DISCONTINUED | OUTPATIENT
Start: 2021-04-13 | End: 2021-04-24

## 2021-04-13 RX ORDER — ALPRAZOLAM 0.5 MG/1
0.5 TABLET ORAL NIGHTLY PRN
Status: DISCONTINUED | OUTPATIENT
Start: 2021-04-13 | End: 2021-04-24

## 2021-04-13 RX ORDER — DEXTROSE AND SODIUM CHLORIDE 5; .45 G/100ML; G/100ML
INJECTION, SOLUTION INTRAVENOUS CONTINUOUS
Status: DISCONTINUED | OUTPATIENT
Start: 2021-04-13 | End: 2021-04-14

## 2021-04-13 RX ORDER — SODIUM CHLORIDE 9 MG/ML
1000 INJECTION, SOLUTION INTRAVENOUS ONCE
Status: COMPLETED | OUTPATIENT
Start: 2021-04-13 | End: 2021-04-13

## 2021-04-13 RX ORDER — ESCITALOPRAM OXALATE 20 MG/1
20 TABLET ORAL DAILY
COMMUNITY

## 2021-04-13 RX ORDER — ACETAMINOPHEN 650 MG/1
650 SUPPOSITORY RECTAL EVERY 4 HOURS PRN
Status: DISCONTINUED | OUTPATIENT
Start: 2021-04-13 | End: 2021-04-24

## 2021-04-13 RX ORDER — ONDANSETRON 2 MG/ML
4 INJECTION INTRAMUSCULAR; INTRAVENOUS EVERY 6 HOURS PRN
Status: DISCONTINUED | OUTPATIENT
Start: 2021-04-13 | End: 2021-04-24

## 2021-04-13 RX ORDER — ACETAMINOPHEN 500 MG
1000 TABLET ORAL ONCE
Status: COMPLETED | OUTPATIENT
Start: 2021-04-13 | End: 2021-04-13

## 2021-04-13 RX ORDER — DONEPEZIL HYDROCHLORIDE 5 MG/1
5 TABLET, FILM COATED ORAL NIGHTLY
Status: DISCONTINUED | OUTPATIENT
Start: 2021-04-13 | End: 2021-04-24

## 2021-04-13 RX ORDER — HEPARIN SODIUM 5000 [USP'U]/ML
5000 INJECTION, SOLUTION INTRAVENOUS; SUBCUTANEOUS EVERY 8 HOURS SCHEDULED
Status: DISCONTINUED | OUTPATIENT
Start: 2021-04-13 | End: 2021-04-24

## 2021-04-13 RX ORDER — LATANOPROST 50 UG/ML
1 SOLUTION/ DROPS OPHTHALMIC NIGHTLY
Status: DISCONTINUED | OUTPATIENT
Start: 2021-04-13 | End: 2021-04-24

## 2021-04-13 RX ORDER — FLUTICASONE PROPIONATE 50 MCG
1 SPRAY, SUSPENSION (ML) NASAL
COMMUNITY

## 2021-04-13 RX ORDER — DEXTROSE MONOHYDRATE 25 G/50ML
50 INJECTION, SOLUTION INTRAVENOUS
Status: DISCONTINUED | OUTPATIENT
Start: 2021-04-13 | End: 2021-04-24

## 2021-04-13 NOTE — CONSULTS
Critical Care H&P/Consult     NAME: Luicta Craven - ROOM: A7/A7 - MRN: [id number] - Age: 80year old - :  7/10/1931    Date of Admission: 2021 11:23 AM  Admission Diagnosis: No admission diagnoses are documented for this encounter.       Assessment/ H/O chronic bronchitis    • H/O: pneumonia    • Hx of diseases NEC     GLAUCOMA AND POSS ULCER   • HYPERLIPIDEMIA    • Hyperlipidemia    • HYPERTENSION    • Lymph node disorder     left iliac LN   • Type II or unspecified type diabetes mellitus without men spray by Each Nare route daily. , Disp: , Rfl:   •  metFORMIN HCl 1000 MG Oral Tab, Take 1 tablet (1,000 mg total) by mouth daily with breakfast., Disp: 90 tablet, Rfl: 3  •  lisinopril 2.5 MG Oral Tab, Take 1 tablet (2.5 mg total) by mouth daily. , Disp: 90 HCT 45.9   MCV 95.0   MCH 30.2   MCHC 31.8   RDW 12.9   NEPRELIM 17.55*   WBC 20.9*   .0     Recent Labs   Lab 04/13/21  1225   *   BUN 16   CREATSERUM 1.64*   GFRAA 32*   GFRNAA 28*   CA 9.6   ALB 4.0   *   K 5.1   CL 95*   CO2 17.0*

## 2021-04-13 NOTE — ED PROVIDER NOTES
Patient Seen in: [facility] Emergency Department      History   Patient presents with:  Fall    Stated Complaint: fall unknown time between bed and bed rail. right leg pinned agaisnt rail, pres*    HPI/Subjective:   HPI  27-year-old female history o GALLBLADDER     • SPECIAL SERVICE OR REPORT  OVER 10 YRS AGO    SLING   • TOTAL ABDOM HYSTERECTOMY                  Social History    Tobacco Use      Smoking status: Never Smoker      Smokeless tobacco: Never Used      Tobacco comment: Per pt used to live Musculoskeletal:      Cervical back: No tenderness. Comments: Tenderness at bilateral hips although able to bend both knees up in bed, no tenderness on knees, able to range left ankle.   Right ankle pain with restricted range of motion, lateral abras components:    WBC 20.9 (*)     Neutrophil Absolute Prelim 17.55 (*)     Neutrophil Absolute 17.55 (*)     Monocyte Absolute 1.40 (*)     All other components within normal limits   TROPONIN I - Normal   RAPID SARS-COV-2 BY PCR - Normal   CBC WITH DIFFEREN PORTABLE  (CPT=71045)    Result Date: 4/13/2021  CONCLUSION:  1. Lungs are clear with hyperaeration, suspicious for emphysema. Please correlate clinically. 2. No acute cardiopulmonary disease. 3. Degenerative changes of the thoracic spine and shoulders. functions to treat single or multiple organ system failure and/or to prevent further life threatening deterioration of the patient's condition. Interventions were performed as documented above.      TOTAL CRITICAL CARE TIME ELAPSED: 120   BODY SYSTEM AT RIS

## 2021-04-13 NOTE — H&P
[facility] Hospitalist History and Physical      Patient presents with:  Fall       PCP: Wilner Mcginnis MD      History of Present Illness: Patient is a 80year old female with PMH sig for dementia, HTN, DM II, breast CA, and anxiety who presented to the SURGICAL HISTORY      bladder raised   • OTHER SURGICAL HISTORY  8-5-2011    Cysto / Flow us - Dr. Chin   • REMOVAL GALLBLADDER     • SPECIAL SERVICE OR REPORT  OVER 10 YRS AGO    SLING   • TOTAL ABDOM HYSTERECTOMY          ALL:    Iodine Tincture 92%   BMI 29.29 kg/m²   Gen: Oriented to persona, not place or time. Elderly. HEENT:  EOMI, PERRLA, OP clear, dry MM  Pulm: Lungs clear bilaterally, normal respiratory effort  CV:  Tachy, reg, no murmur. Normal PMI.     Abd: Abdomen soft, nontender, non XR ANKLE (MIN 3 VIEWS), RIGHT (CPT=73610)    Result Date: 4/13/2021  PROCEDURE:  XR ANKLE (MIN 3 VIEWS), RIGHT (CPT=73610)  TECHNIQUE:  Three views were obtained. COMPARISON:  None.   INDICATIONS:  fall unknown time between bed and bed rail. right leg CONCLUSION:  No acute process.    Dictated by (CST): Hetal Reid MD on 4/13/2021 at 2:29 PM     Finalized by (CST): Hetal Reid MD on 4/13/2021 at 2:35 PM       CT BRAIN OR HEAD (34521)    Result Date: 3/16/2021  PROCEDURE:  CT BRAIN OR HEAD (5958 reconstructions are generated. Dose reduction techniques were used. Dose information is transmitted to the [employer] [facility] [employer]) NRDR ([address]) which includes the Dose Index Registry.   PATIENT STATED HISTORY: (As tra hypertrophy are noted without stenosis of central canal or foramina. C3-C4:  There are bilateral uncovertebral joint osteophytes and there is bilateral facet hypertrophy. Findings cause moderate right and mild left foraminal stenosis.  C4-C5:  Bilateral un 1. Lungs are clear with hyperaeration, suspicious for emphysema. Please correlate clinically. 2. No acute cardiopulmonary disease. 3. Degenerative changes of the thoracic spine and shoulders.     Dictated by (CST): Nayeli Celaya DO on 4/13/2021 at 1:13 PM ED after being found immobile in bed.       # Diabetic ketoacidosis   - suspect brought on by UTI and dehydration   - admit to ICU on insulin gtt per protocol and IVFs per protocol   - pulm/CC c/s   - check BMP's per protocol to evaluate for closing of AG

## 2021-04-13 NOTE — ED INITIAL ASSESSMENT (HPI)
Fall unknown time. Pt last spoke to her friend 12-14 hours PTA. Pt was found between her bed and the bed rail with her right leg pinned against the rail, pressure ulcer noted.

## 2021-04-14 RX ORDER — SODIUM CHLORIDE, SODIUM GLUCONATE, SODIUM ACETATE, POTASSIUM CHLORIDE AND MAGNESIUM CHLORIDE 526; 502; 368; 37; 30 MG/100ML; MG/100ML; MG/100ML; MG/100ML; MG/100ML
100 INJECTION, SOLUTION INTRAVENOUS CONTINUOUS
Status: DISCONTINUED | OUTPATIENT
Start: 2021-04-14 | End: 2021-04-15

## 2021-04-14 RX ORDER — SALIVA STIMULANT COMB. NO.3
SPRAY, NON-AEROSOL (ML) MUCOUS MEMBRANE AS NEEDED
Status: DISCONTINUED | OUTPATIENT
Start: 2021-04-14 | End: 2021-04-24

## 2021-04-14 RX ORDER — MAGNESIUM SULFATE HEPTAHYDRATE 40 MG/ML
2 INJECTION, SOLUTION INTRAVENOUS ONCE
Status: COMPLETED | OUTPATIENT
Start: 2021-04-14 | End: 2021-04-14

## 2021-04-14 NOTE — PROGRESS NOTES
Oliverio Craven Patient Status:  Inpatient    7/10/1931 MRN [id number]   [facility] 4SW-A Attending Lester Lorenz,    Hosp Day # 1 PCP Pepper Kelly MD     Critical Care Progress Note      Assessment/Plan:  1.  HARISH  - Brant Supple, no adenopathy or elevation in JVP  Cardiovascular: RRR, no murmurs or extra heart sounds   Respiratory: CTAB   GI: Soft, NTND, +normoactive BS   Ext: No cyanosis, clubbing or edema     Recent Labs   Lab 04/14/21  0416   RBC 4.39   HGB 13.4   HCT 41

## 2021-04-14 NOTE — SLP NOTE
ADULT SWALLOWING EVALUATION    ASSESSMENT    ASSESSMENT/OVERALL IMPRESSION:  Patient is an 79 y/o female admitted after being found down in her room. PMHx significant for HTN, DM-2, breast cancer, and dementia.  SLP orders received to evaluate oropharyngeal Problems:    Diabetic acidosis without coma (HCC)    Non-traumatic rhabdomyolysis    Leukocytosis, unspecified type    ROSA (acute kidney injury) ([facility] [address]    Sepsis secondary to UTI [facility])      Past Medical History  Past Medical History:   Diagnosis Date   • AN Functional Limits       Voice Quality: Clear  Respiratory Status: Nasal cannula  Consistencies Trialed: Thin liquids; Nectar thick liquids;Puree; Soft solid  Method of Presentation: Staff/Clinician assistance  Patient Positioning: Upright;Midline    Oral Pha

## 2021-04-14 NOTE — PLAN OF CARE
Assumed care after RN report; pt resting in bed. Alert to self; very confused and impulsive; pulls at lines/trying to get out of bed. Febrile - ccAPN [employer] updated; rectal tylenol order received. Insulin gtt per order; IVF changed by ccAPN. VSS.  Denies estrella

## 2021-04-14 NOTE — PALLIATIVE CARE NOTE
Lizbeth Mccoy is an 80year old female found at home stuck between her bed and bed rail. She was found around 10 am in the morning, had spoken to her friend the night prior about 12.-14 hours earlier.  She has know history of DM, had not been taking metformi

## 2021-04-14 NOTE — HOME CARE LIAISON
Ptnt current with [facility] for sn/pt/ot/st  Will need a CANDE order on or before dc.   Please send Aidin on dc    Thanks  Rachael Pringle

## 2021-04-14 NOTE — PROGRESS NOTES
[facility]ist note    PCP: Kelly Duffy MD    Chief Complaint:  F/u dka, rhabdo    SUBJECTIVE:  Pt in restraints overnight due to agitation  Today, she recognizes that she was hospitalized due to her elevated glc and expresses gratitude; she 171* 172*       Recent Labs   Lab 04/13/21  1225   TROP <0.045         Meds:     • lidocaine-menthol  2 patch Transdermal Daily   • insulin detemir  15 Units Subcutaneous Daily   • Insulin Aspart Pen  1-68 Units Subcutaneous TID CC   • Insulin Aspart Pen

## 2021-04-15 PROCEDURE — 99223 1ST HOSP IP/OBS HIGH 75: CPT | Performed by: NURSE PRACTITIONER

## 2021-04-15 RX ORDER — SODIUM CHLORIDE, SODIUM LACTATE, POTASSIUM CHLORIDE, CALCIUM CHLORIDE 600; 310; 30; 20 MG/100ML; MG/100ML; MG/100ML; MG/100ML
INJECTION, SOLUTION INTRAVENOUS CONTINUOUS
Status: DISCONTINUED | OUTPATIENT
Start: 2021-04-15 | End: 2021-04-24

## 2021-04-15 RX ORDER — POTASSIUM CHLORIDE 14.9 MG/ML
20 INJECTION INTRAVENOUS ONCE
Status: COMPLETED | OUTPATIENT
Start: 2021-04-15 | End: 2021-04-15

## 2021-04-15 NOTE — SLP NOTE
SPEECH DAILY NOTE - INPATIENT    ASSESSMENT & PLAN   ASSESSMENT  Pt seen for dysphagia tx to assess tolerance with recommended diet, ensure proper utilization of aspiration precautions and provide pt/family education.   Clinical swallow evaluation completed aspiration with 90 % accuracy over 1-2 session(s). In Progress   Goal #3 The patient/family/caregiver will demonstrate understanding and implementation of aspiration precautions and swallow strategies independently over 1-2 session(s).   In Progress

## 2021-04-15 NOTE — CONSULTS
[facility]  Report of Consultation    Apollo Craven Patient Status:  Inpatient    7/10/1931 MRN [id number]   [facility] 4SW-A Attending Miguelina Miller MD   Hosp Day # 2 PCP Nina Mccarthy MD     ADMIT DATE AND TIME: 2021 1 03/2019    IDC   • OTHER SURGICAL HISTORY      bladder raised   • OTHER SURGICAL HISTORY  8-5-2011    Cysto / Flow us - Dr. Ernie Chavez   • REMOVAL GALLBLADDER     • SPECIAL SERVICE OR REPORT  OVER 10 YRS AGO    SLING   • TOTAL ABDOM HYSTERECTOMY       Family g, Oral, Q15 Min PRN **OR** Glucose-Vitamin C (DEX-4) chewable tab 4 tablet, 4 tablet, Oral, Q15 Min PRN **OR** dextrose 50 % injection 50 mL, 50 mL, Intravenous, Q15 Min PRN **OR** glucose (DEX4) oral liquid 30 g, 30 g, Oral, Q15 Min PRN **OR** Glucose-Vi 30.4   MCHC 31.8 32.5 33.8   RDW 12.9 13.0 12.9   NEPRELIM 17.55* 8.38* 6.13   WBC 20.9* 12.4* 8.7   .0 152.0 161.0       Recent Labs   Lab 04/13/21  1225 04/13/21 2005 04/14/21  0416 04/14/21  0859 04/15/21  0422   *   < > 160* 171* 103* 1:13 PM     Finalized by (CST): Bina De Leon DO on 4/13/2021 at 1:14 PM       XR HIP W OR WO PELVIS(MIN 5 VIEWS),BILAT(CPT=73523)    Result Date: 4/13/2021  CONCLUSION:  1. No acute process.  2. 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 Last CT was in Fall and she had good control of the cancer.  She

## 2021-04-15 NOTE — CM/SW NOTE
FRANCISCO JAVIER order for caregiver resources. Pt is currently in ICU. FRANCISCO JAVIER s/w pt's daughter Malika Holman. Pt lives alone, has caregiver MWF from 9-12pm. Pt is supposed to ambulate with her walker but dtr reporting that pt is \"stubborn\" and does not like to use it.  Pt will u

## 2021-04-15 NOTE — PLAN OF CARE
Assumed care of pt at 1930  Pt A&Ox2 to person and place. Disoriented to time and situation. Pt forgetful and confused. Bilateral wrist restraints remain on, pt trying to take off Ivs. On RA no cough noted. No BM overnight. Clement intact with adequate UO.  D

## 2021-04-15 NOTE — DIETARY NOTE
[facility]    NUTRITION ASSESSMENT    Pt does not meet malnutrition criteria. NUTRITION INTERVENTION:    1. RD nutrition Care Plan- See RD nutrition assessment for additional recommendations   2. Meal and Snacks - monitor patient po intake.  Ann Dalton Packets     Percent Meals Eaten (last 3 days)     Date/Time Percent Meals Eaten (%)    04/14/21 1600  20 %            FOOD/NUTRITION RELATED HISTORY:   Appetite: Poor  Intake: 20% x 1 Meal  Intake Meeting Needs: No, but supplements to maximize  Food Allerg

## 2021-04-15 NOTE — PROGRESS NOTES
[facility] Hospitalist note    PCP: Will Elias MD    Chief Complaint:  F/u dka, rhabdo    SUBJECTIVE:  Doing okay. Somewhat alert but eyes closed in bed. Answered some orientation questions appropriately for staff earlier.      OBJECTIVE:  Temp:  [9 11  --   --   --   --  7  --    CREATSERUM 0.98  --  0.92  --  0.84  --  0.75  --   --   --   --  0.55  --    CA 8.9  --  8.5  --  8.6  --  8.5  --   --   --   --  8.0*  --    MG 1.8   < > 1.6   < > 2.5   < > 2.4   < > 2.3 2.2 2.1 2.2 2.2   PHOS 2.7   < > --> transitioned to subcutaneous insulin with adequate glc control  - pt was only on metformin as outpatient but need to clarify if she was taking this.   - cont empiric rocephin for UTI, IVF for rhabdo     # UTI  - cont empiric rocephin   - f/u UCx  - wbc

## 2021-04-15 NOTE — PHYSICAL THERAPY NOTE
PHYSICAL THERAPY EVALUATION - INPATIENT     Room Number: 453/453-A  Evaluation Date: 4/15/2021  Type of Evaluation: Initial  Physician Order: PT Eval and Treat    Presenting Problem: fall, DKA  Reason for Therapy: Mobility Dysfunction and Discharge P SLING   • TOTAL ABDOM HYSTERECTOMY         HOME SITUATION  Type of Home: House   Home Layout: Two level; Able to live on main level                Lives With: Alone;Caregiver part-time (CG 3x/wk for IADLs)  Drives: No  Patient Owned Equipment: Rolling Delmus Bring BASIC MOBILITY  How much difficulty does the patient currently have. ..  -   Turning over in bed (including adjusting bedclothes, sheets and blankets)?: A Lot   -   Sitting down on and standing up from a chair with arms (e.g., wheelchair, bedside commode, e set    ASSESSMENT   Patient is a 80year old female admitted on 4/13/2021 fall at home. Pt presenting with UTI and DKA. Head and C/S CT (-). Pertinent comorbidities and personal factors impacting therapy include Alzheimer's dementia, breast CA, HTN.  In thi donned by therapist: gloves, goggles, surgical mask

## 2021-04-15 NOTE — CONSULTS
[address] Craven  [id number]  Hospital Day #2  Date of Consult:   4/14/2021        Reason for Consultation:      Consult requested by Alexus COBIAN for evaluation of palliative care needs, Iliana Cloud stated as uncontrolled    • Unspecified essential hypertension    • Urinary incontinence      Past Surgical History:   Procedure Laterality Date   • CHOLECYSTECTOMY     • COLONOSCOPY  2008   • HYSTERECTOMY     • NEEDLE BIOPSY RIGHT  03/2019    IDC   • OTHE Comment:Increased heart rate  Fluconazole             RASH  Sulfa Antibiotics       HIVES    Medications:       Current Facility-Administered Medications:   •  [COMPLETED] potassium phosphate dibasic 15 mmol in sodium chloride 0.9% 250 mL IVPB, 15 mmol Oral, Daily  •  latanoprost (XALATAN) 0.005 % ophthalmic solution 1 drop, 1 drop, Both Eyes, Nightly  •  acetaminophen (TYLENOL) tab 650 mg, 650 mg, Oral, Q4H PRN **OR** acetaminophen (TYLENOL) 160 MG/5ML oral liquid 650 mg, 650 mg, Oral, Q4H PRN **OR** ac ([address]) which includes the Dose Index Registry. PATIENT STATED HISTORY: (As transcribed by Technologist)  fall         FINDINGS:  Bones are osteoporotic. The odontoid is very osteoporotic with multiple erosions.   Multilevel Dictated by (CST): Lea Durant MD on 4/13/2021 at 2:29 PM       Finalized by (CST): Lea Durant MD on 4/13/2021 at 2:35 PM     XR CHEST AP PORTABLE  (CPT=71045)  Narrative: PROCEDURE:  XR CHEST AP PORTABLE  (CPT=71045)     TECHNIQUE:  AP chest radi mild osteoarthritic changes of bilateral hips. Mild arthritic changes of the symphysis pubis and bilateral SI joints also suggested.   SOFT TISSUES:  There is a moderate degree of enthesopathy along the greater trochanters of both hips consistent with unde Review of Systems:      Palliative Care symptom needs assessed:      Dyspnea: not now, I use to be short of breath   Cough: denies  Nausea: denies  Pain: denies    Objective/Physical Exam:     Vital Signs: BP (!) 131/118   Pulse 81   Temp 99.6 °F Drowsy/confused   20 Bedbound Extensive Disease  Can’t do any work Max Assist  Total Care Minimal Drowsy/confused   10 Bedbound/coma Extensive Disease  Can’t do any work  coma  Max Assist  Total Care Mouth care Drowsy or coma   0 Death     Palliative Care is unable to make her own decisions currently she can change her code status to DNAR / DNI selective treatment. Althea Freeman would like that order in place.  Upon review of her POA forms Althea Freeman will further discuss with Wandy's PCP and neurologist to gain a signed coma associated with other specified diabetes mellitus ([facility] [address]    Diabetic acidosis without coma ([facility] [address]    Non-traumatic rhabdomyolysis    Leukocytosis, unspecified type    ROSA (acute kidney injury) ([facility] [address]    Sepsis secondary to UTI [facility])    Goals of care

## 2021-04-15 NOTE — OCCUPATIONAL THERAPY NOTE
OCCUPATIONAL THERAPY EVALUATION - INPATIENT     Room Number: 453/453-A  Evaluation Date: 4/15/2021  Type of Evaluation: Quick Eval  Presenting Problem: DKA, sepsis d/t UTI, rhabdomyolysis    Physician Order: IP Consult to Occupational Therapy  Reason for T Cysto / Flow us - Dr. Flor Srinivasan   • REMOVAL GALLBLADDER     • SPECIAL SERVICE OR REPORT  OVER 10 YRS AGO    SLING   • TOTAL ABDOM HYSTERECTOMY         OCCUPATIONAL PROFILE    HOME SITUATION  Type of Home: House  Home Layout: Two level; Able to live on main flexion: <3/4 active      Upper extremity strength is within functional limits except for the following;  Right Shoulder flexion  3/5  Left Shoulder flexion  3+/5    COORDINATION  Gross Motor    impaired   Fine Motor    impaired     ADDITIONAL TESTS female admitted on 4/13/2021 for DKA, sepsis d/t UTI, rhabdomyolysis. Complete medical history and occupational profile noted above. Functional outcome measures completed include:  The AM-CHETAN ' '6-Clicks' Inpatient Daily Activity Short Form was completed an reorientation;Patient/Family training;Patient/Family education;Equipment eval/education; Compensatory technique education  Rehab Potential : Fair  Frequency (Obs): 3-5x/week  Number of Visits to Meet Established Goals: 7       ADL GOALS:  Patient will perfo

## 2021-04-16 RX ORDER — HYDRALAZINE HYDROCHLORIDE 20 MG/ML
10 INJECTION INTRAMUSCULAR; INTRAVENOUS EVERY 4 HOURS PRN
Status: DISCONTINUED | OUTPATIENT
Start: 2021-04-16 | End: 2021-04-24

## 2021-04-16 NOTE — PROGRESS NOTES
Critical Care Progress Note     Assessment / Plan:  1. DKA  - off insulin gtt  - levemir  - SSI  2. Possible UTI - elevated PCT   - ceftriaxone  - follow-up cultures  3. Rhabdo - improved  - IVFs  4. Falls  - PT eval  5.  Breast CA  - outpatient oncology fo

## 2021-04-16 NOTE — CM/SW NOTE
Per rounds pt may be ready for discharge over the weekend. CM emailed ETHAN list to daughter Jet Buchanan. CM spoke with Jet Montanoosmar to inform her to check on list and of possible discharge this weekend pending progress. Jet Buchanan stating she will review.  CM to contact Jet Buchanan

## 2021-04-16 NOTE — SLP NOTE
SPEECH DAILY NOTE - INPATIENT    ASSESSMENT & PLAN   ASSESSMENT  Pt seen for dysphagia tx to assess tolerance with recommended diet, ensure proper utilization of aspiration precautions and provide pt/family education. Pt recently transferred out of ICU.  Pt of aspiration with 90 % accuracy over 1-2 session(s).    In Progress   Goal #3 The patient/family/caregiver will demonstrate understanding and implementation of aspiration precautions and swallow strategies independently over 1-2 session(s).   In Progress

## 2021-04-16 NOTE — OCCUPATIONAL THERAPY NOTE
OCCUPATIONAL THERAPY TREATMENT NOTE - INPATIENT     Room Number: 456/815-V  Session: 1   Number of Visits to Meet Established Goals: 7    Presenting Problem: DKA, sepsis d/t UTI, rhabdomyolysis    History related to current admission: Patient is a 79y/o fe GALLBLADDER     • SPECIAL SERVICE OR REPORT  OVER 10 YRS AGO    SLING   • TOTAL ABDOM HYSTERECTOMY         SUBJECTIVE  \"I am sorry you have to stand here. \" (re: while pt having BM)    Patient self-stated goal is n/a    OBJECTIVE  Precautions: Bed/chair a standing to sit. Sit to stand from recliner chair with max A and cueing for body mechanics  Patient transferred to commode with mod A and use of walker, cueing for sequencing  On/off commode: max A  Christie-care: max A  Patient End of Session: Up in chair; Wi

## 2021-04-16 NOTE — PHYSICAL THERAPY NOTE
Attempted to see Pt this am for PT session, however, Pt in process of transferring to med/onc. Will follow up as schedule permits.

## 2021-04-16 NOTE — PLAN OF CARE
Assumed care at 1100. Pt received alert and oriented to self. Vital signs stable, 's. MD aware, orders for PRN hydralazine for SBP > 170. PT/OT eval, recommending ETHAN. Ambulating with 2 assist and walker to chair.  Blisters and bruising to inner thig

## 2021-04-16 NOTE — PLAN OF CARE
Received pt sitting up in chair eating dinner. Requesting going back to bed. Walker used to assist.  Pt needed max assist with 3 people. Knees aminata when assisted to stand and shuffled 2 steps to sit at edge of bed.  Unable to move back onto bed, lifte

## 2021-04-16 NOTE — PROGRESS NOTES
[facility] Hospitalist note    PCP: Lauri Argueta MD    Chief Complaint:  F/u dka, rhabdo    SUBJECTIVE:  States her legs hurt, some swelling.   No sob  States she remembers me from previous visit    OBJECTIVE:  Temp:  [98.5 °F (36.9 °C)-100 °F (37.8 ° 0.75  --   --   --  0.55  --  0.51*   CA 8.5  --  8.6  --  8.5  --   --   --  8.0*  --  8.0*   MG 1.6   < > 2.5   < > 2.4   < > 2.2 2.1 2.2 2.2 2.1   PHOS 2.9   < > 2.9   < > 2.6   < > 2.1* 2.1* 2.1* 2.8 2.6   *  --  160*  --  171*  --   --   --  10 metformin on dc or at snf. Will dc carb coverage since appetite has been limited anyway. Cont ssi. - cont empiric rocephin for UTI, IVF for rhabdo.  Cultures ntd     # UTI  - cont empiric rocephin   - urine culture neg  - wbc improved     # ROSA- resolved

## 2021-04-16 NOTE — PHYSICAL THERAPY NOTE
PHYSICAL THERAPY TREATMENT NOTE - INPATIENT    Room Number: 093/558-J     Session: 1  Number of Visits to Meet Established Goals: 5    Presenting Problem: fall, DKA, UTI     History related to current admission: Pt is a 80 y.o. female admitted 4/13/21 fal HYSTERECTOMY         SUBJECTIVE  \"I might fall if I try to get up. \"     Patient’s self-stated goal is to get stronger and be independent.      OBJECTIVE  Precautions: Bed/chair alarm    WEIGHT BEARING RESTRICTION  Weight Bearing Restriction: None force generation, balance, and blocking LEs from sliding anteriorly. Pt stood statically for 45 seconds with MOD assist for balance and assisted with pericare. Pt ambulated 3ft with RW and MAX assist for balance/walker management.  Pt requires constant cuin more supportive living environment long term. DISCHARGE RECOMMENDATIONS  PT Discharge Recommendations: Sub-acute rehabilitation (ELOS 13-15 days)     PLAN  PT Treatment Plan: Bed mobility; Endurance; Energy conservation;Patient education;Gait training;Str

## 2021-04-16 NOTE — PROGRESS NOTES
Report given to [facility], patient to transfer to [phone]  All belongings with patinet

## 2021-04-16 NOTE — CONSULTS
[facility]  Report of Inpatient Wound Care Consultation    Oumar Craven Patient Status:  Inpatient    7/10/1931 MRN [id number]   [facility] 4SW-A Attending Gaviota Raya MD   Hosp Day # 3 PCP Yoana Jordan MD     Reason for Date    WBC 7.1 04/16/2021    HGB 11.2 04/16/2021    HCT 33.9 04/16/2021    .0 04/16/2021    CREATSERUM 0.51 04/16/2021    BUN 8 04/16/2021     04/16/2021    K 3.4 04/16/2021     04/16/2021    CO2 27.0 04/16/2021     04/16/2021 every other day and as needed. 3.Right medial lower leg - Cleanse with saline. Cover with bordered foam.Change every 3 days and and as needed. Apply tubigrip-from the base of the toes to below the knee area.     Thank you for allowing me to participate i

## 2021-04-17 ENCOUNTER — APPOINTMENT (OUTPATIENT)
Dept: GENERAL RADIOLOGY | Facility: HOSPITAL | Age: 86
DRG: 638 | End: 2021-04-17
Attending: HOSPITALIST
Payer: MEDICARE

## 2021-04-17 PROCEDURE — 74230 X-RAY XM SWLNG FUNCJ C+: CPT | Performed by: HOSPITALIST

## 2021-04-17 NOTE — PROGRESS NOTES
[facility] Hospitalist note    PCP: Cass Jones MD    Chief Complaint:  F/u dka, rhabdo    SUBJECTIVE:  Feels well  Eating breakfast  afebrile    OBJECTIVE:  Temp:  [98 °F (36.7 °C)-99.5 °F (37.5 °C)] 98.2 °F (36.8 °C)  Pulse:  [72-89] 86  Resp:  [1 8.0*  --  8.0* 9.0   MG 2.5   < > 2.4   < > 2.1 2.2 2.2 2.1 1.8   PHOS 2.9   < > 2.6   < > 2.1* 2.1* 2.8 2.6 3.5   *  --  171*  --   --  103*  --  105* 158*    < > = values in this interval not displayed.        Recent Labs   Lab 04/13/21  1225 04/15 given IVF for rhabdo     # UTI  - cont empiric rocephin   - urine culture neg  - wbc improved     # ROSA- resolved  - suspect due to volume depletion      # Rhabdomyolysis   - likely due to prolonged immobility   - CK down to 2K, will dc IVF as pt with some

## 2021-04-17 NOTE — PLAN OF CARE
Patient alert and oriented to self. RA. Per tele, NSR. Afebrile. Received PRN Hydralazine for SBP>170-see MAR. Incontinent, purewick in place with adequate urine output. Continuing IV fluids per MAR. Blisters and bruising to inner thighs.  Nectar thickened

## 2021-04-17 NOTE — VIDEO SWALLOW STUDY NOTE
ADULT VIDEOFLUOROSCOPIC SWALLOWING STUDY    Admission Date: 4/13/2021  Evaluation Date: 04/17/21  Radiologist: Dr. Angeles Rincon: Regular  Diet Recommendations - Liquids: Thin Liquids (small single sips).   No succes Home with support  History of Recent: Difficulty breathing  Precautions: Aspiration  Imaging results: 4/13 CXR    CONCLUSION:    1. Lungs are clear with hyperaeration, suspicious for emphysema. Please correlate clinically.    2. No acute cardiopulmonary di Penetration: None  Tracheal Aspiration: None  Penetration Aspiration Scale Score: Score 2: Material enters the airway, remains above the vocal folds, and is ejected from the airway       Overall Impression: Pt seated 90 degrees upright in SONIYA chair and vie patient/family/caregiver. Agreement/Understanding verbalized and all questions answered to their apparent satisfaction. INTERDISCIPLINARY COMMUNICATION  Reviewed results with Radiologist; agreement verbalized.     Patient Experiencing Pain: No        FO

## 2021-04-17 NOTE — PLAN OF CARE
A&Ox2, pleasantly confused, reoriented throughout the day as needed. [place]. No complaints of pain this shift. Lidocaine patch applied to mid back for chronic back pain, states she feels \"OK\" when asked.   Video swallow completed this AM- patient able to t to electrolyte replacements, including rhythm and repeat lab results as appropriate  - Fluid restriction as ordered  - Instruct patient on fluid and nutrition restrictions as appropriate  Outcome: Progressing     Problem: Impaired Cognition  Goal: Patient

## 2021-04-18 RX ORDER — LISINOPRIL 2.5 MG/1
2.5 TABLET ORAL DAILY
Status: DISCONTINUED | OUTPATIENT
Start: 2021-04-19 | End: 2021-04-20

## 2021-04-18 NOTE — PLAN OF CARE
Pt is alert and pleasantly confused. Follows commands. Reoriented as necessary. Pt denies pain. Pt tolerating regular diet and nectar thick liquids. Has remained afebrile, VSS. Spoke to daughter Davion Brown on the phone overnight.  IV in the right hand covered/w Reorient and redirection as needed  - Assess for the need to continue restraints  Outcome: Progressing

## 2021-04-18 NOTE — SLP NOTE
SPEECH DAILY NOTE - INPATIENT    ASSESSMENT & PLAN   ASSESSMENT  Pt seen for dysphagia tx to assess tolerance with recommended diet, ensure proper utilization of aspiration precautions and provide pt/family education. Patient alert and up in bed.   RN cont without overt signs or symptoms of aspiration with 90 % accuracy over 1-2 session(s).  Goal Discontinued   Goal #2 The patient will tolerate regular consistency and thin liquids without overt signs or symptoms of aspiration with 90 % accuracy over 1-2 sess

## 2021-04-18 NOTE — PROGRESS NOTES
[facility] Hospitalist Progress Note     [facility]      SUBJECTIVE:  Feeling ok today  Denies pain  CK improved    OBJECTIVE:  Temp:  [97.9 °F (36.6 °C)-98.1 °F (36.7 °C)] 97.9 °F (36.6 °C)  Pulse:  [78-88] 84  Resp:  [16-21] 20  BP: (125-170)/(43-96) 138 04/17/21  0752 04/17/21  1158 04/17/21  1631 04/17/21  2110 04/18/21  0708   PGLU 142* 146* 134* 140* 125*       Imaging:  XR ANKLE (MIN 3 VIEWS), RIGHT (CPT=73610)    Result Date: 4/13/2021  PROCEDURE:  XR ANKLE (MIN 3 VIEWS), RIGHT (CPT=73610)  TECHNIQUE intact. There is no extra cerebral collection. A fracture is not identified. The  sinuses are clear. CONCLUSION:  No acute process.    Dictated by (CST): James Villarreal MD on 4/13/2021 at 2:29 PM     Finalized by (CST): James Vilalrreal MD on 4/ (CPT=74230), 10/17/2014, 9:44 AM.  INDICATIONS:  recent AMS, underlying dementia, r/o aspiration with thin liquids  PATIENT STATED HISTORY: (As transcribed by Technologist)  Assess swallow.    CINE CAPTURES:  8 FLUORSCOPY TIME:  1minute 18seconds RADIATION 4/13/2021  PROCEDURE:  XR HIP W OR WO PELVIS (MIN 5 VIEWS), BILAT (CPT=73523)  TECHNIQUE:  Bilateral min 5 views of the hip and pelvis if performed. COMPARISON:  None.   INDICATIONS:  fall unknown time between bed and bed rail. right leg pinned against Candie Vega Transdermal, Daily  Insulin Aspart Pen (NOVOLOG) 100 UNIT/ML flexpen 2-10 Units, 2-10 Units, Subcutaneous, TID CC and HS  glucose (DEX4) oral liquid 15 g, 15 g, Oral, Q15 Min PRN   Or  Glucose-Vitamin C (DEX-4) chewable tab 4 tablet, 4 tablet, Oral, Q15 Mi IVF for rhabdo --> now stopped     # UTI  - cont empiric rocephin --> completed after dose given this afternoon  - urine culture neg  - wbc improved     # ROSA- resolved  - suspect due to volume depletion      # Rhabdomyolysis   - likely due to prolonged im

## 2021-04-18 NOTE — PLAN OF CARE
Patient alert, oriented to person, disoriented to time, place and situation,calm and cooperative. Patient denies discomfort, vital signs stable. Patient noted to be coughing when eating scrambled egg this morning, Speech Therapist notified.  Respirations no

## 2021-04-19 PROCEDURE — 99233 SBSQ HOSP IP/OBS HIGH 50: CPT | Performed by: NURSE PRACTITIONER

## 2021-04-19 NOTE — PHYSICAL THERAPY NOTE
PHYSICAL THERAPY TREATMENT NOTE - INPATIENT    Room Number: 404/073-P     Session: 2   Number of Visits to Meet Established Goals: 5    Presenting Problem: fall, DKA, UTI    Problem List  Principal Problem:    Diabetic ketoacidosis without coma associated Restriction: None                PAIN ASSESSMENT   Ratin  Location: abdomen  Management Techniques:  Activity promotion;Repositioning    BALANCE after short rest. C/o pain from BP cuff. Performed BUE therex to assess strength. Left pt in bed with bed in chair position, basin nearby. RN made aware of nausea.      THERAPEUTIC EXERCISES  Lower Extremity Heel slides     Upper Extremity  - open/kenroy

## 2021-04-19 NOTE — CM/SW NOTE
SW received an order for community palliative care. SW spoke with [facility], and they will follow up with referral. SW confirmed with [facility] that they work with [facility] for palliative care. SW to follow.

## 2021-04-19 NOTE — CM/SW NOTE
Care Progression Note:  Active Acute Medical Issue:   Diabetic ketoacidosis without coma associated with other specified diabetes mellitus ([facility] [address]     Other Contributing Medical Factors/Dx  PNA, HLD, HTN, DMII, breast CA, anxiety    Length of stay: 6  GMLOS:

## 2021-04-19 NOTE — PROGRESS NOTES
[facility] Hospitalist Progress Note     [facility]      SUBJECTIVE:  No acute events  Confused for staff    OBJECTIVE:  Temp:  [97.9 °F (36.6 °C)-99 °F (37.2 °C)] 98.6 °F (37 °C)  Pulse:  [70-85] 70  Resp:  [16-20] 16  BP: (145-168)/(51-78) 168/61    [place] 2. 6* 2.7*       Recent Labs   Lab 04/18/21  0708 04/18/21  1211 04/18/21  1709 04/18/21  2311 04/19/21  0533   PGLU 125* 155* 165* 140* 141*       Imaging:  XR ANKLE (MIN 3 VIEWS), RIGHT (CPT=73610)    Result Date: 4/13/2021  PROCEDURE:  XR ANKLE (MIN 3 VI effect. Basal cisterns are intact. There is no extra cerebral collection. A fracture is not identified. The  sinuses are clear. CONCLUSION:  No acute process.    Dictated by (CST): Dwaine Quintero MD on 4/13/2021 at 2:29 PM     Finalized by TEJA , XR, XR VIDEO SWALLOW (CPT=74230), 10/17/2014, 9:44 AM.  INDICATIONS:  recent AMS, underlying dementia, r/o aspiration with thin liquids  PATIENT STATED HISTORY: (As transcribed by Technologist)  Assess swallow.    CINE CAPTURES:  8 FLUORSCOPY TIME:  1minu VIEWS),BILAT(CPT=73523)    Result Date: 4/13/2021  PROCEDURE:  XR HIP W OR WO PELVIS (MIN 5 VIEWS), BILAT (CPT=73523)  TECHNIQUE:  Bilateral min 5 views of the hip and pelvis if performed. COMPARISON:  None.   INDICATIONS:  fall unknown time between bed an Oral, PRN  glycerin-Hypromellose- (ARTIFICAL TEARS) 0.2-0.2-1 % ophthalmic solution 1 drop, 1 drop, Both Eyes, QID PRN  lidocaine-menthol 4-1 % 2 patch, 2 patch, Transdermal, Daily  glucose (DEX4) oral liquid 15 g, 15 g, Oral, Q15 Min PRN   Or  Gluc now stopped     # UTI  - cont empiric rocephin --> completed after dose given this afternoon  - urine culture neg  - wbc improved     # ROSA- resolved  - suspect due to volume depletion      # Rhabdomyolysis   - likely due to prolonged immobility   - CK althea

## 2021-04-19 NOTE — PROGRESS NOTES
[facility] Liaison received palliative referral for community PC services. Waiting to obtain insurance (verification/authorization) prior to pursuing.          Scott Gautam  [facility] Liaison   [phone]

## 2021-04-19 NOTE — PROGRESS NOTES
[address] [facility] [address] Follow Up     Tiffany Baker  [id number]  Hospital Day #6  Date of Consult: 04/15/21  Patient seen at: [facility]     Subjective:      Patient was seen and examined without family  at the bedside.      Ignacio Abdullahi HIVES  Amoxicillin             RASH  Darvon [Bexophene]          Comment:Increased heart rate  Decongestant Advanc*        Comment:Increased heart rate  Fluconazole             RASH  Sulfa Antibiotics       HIVES    Medications:       Current Facility-Admi suppository 650 mg, 650 mg, Rectal, Q4H PRN  No current outpatient medications on file.       Labs/ imaging     Hematology:  Lab Results   Component Value Date    WBC 10.5 04/19/2021    HGB 11.9 (L) 04/19/2021    HCT 35.4 04/19/2021    .0 04/19/2021 Full Normal Full   80 Full Some disease  Normal w/effort Full Normal or  Reduced Full   70 Reduced Some disease  Can’t perform job Full Normal or   Reduced Full   60 Reduced Significant disease  Can’t perform hobby Occasional  Assist Normal or   Reduced Fu discharge. Documentation in process from providers related to decisional capacity of Alexa Gary to allow Justina Gaspar to act as the POA. 2. CODE STATUS: DNAR with selective treatment. 3. POLST: deferred due to formality of POA form.  Will continue to follow up w

## 2021-04-19 NOTE — SLP NOTE
SPEECH DAILY NOTE - INPATIENT    ASSESSMENT & PLAN   ASSESSMENT  Pt seen for dysphagia tx to assess tolerance with recommended diet, ensure proper utilization of aspiration precautions and provide pt/family education. Patient awake but confused in bed.  Sh session(s).   In Progress   Goal #4 VFSS to be completed Goal met        FOLLOW UP  Follow Up Needed (Documentation Required): Yes  SLP Follow-up Date: 04/20/21  Number of Visits to Meet Established Goals: 2    Session: 2    Prior to entering room, SLP brayan

## 2021-04-19 NOTE — PROGRESS NOTES
Residential Palliative Liaison received palliative referral for community PC services. spoke with daughter Jazmin Gomes via phone to discuss Residential PC services.  Residential PC services discussed and is agreeable  to our [facility] services upon DC to [facility]

## 2021-04-20 ENCOUNTER — APPOINTMENT (OUTPATIENT)
Dept: CT IMAGING | Facility: HOSPITAL | Age: 86
DRG: 638 | End: 2021-04-20
Attending: INTERNAL MEDICINE
Payer: MEDICARE

## 2021-04-20 PROCEDURE — 74176 CT ABD & PELVIS W/O CONTRAST: CPT | Performed by: INTERNAL MEDICINE

## 2021-04-20 PROCEDURE — 99233 SBSQ HOSP IP/OBS HIGH 50: CPT | Performed by: NURSE PRACTITIONER

## 2021-04-20 RX ORDER — METOCLOPRAMIDE HYDROCHLORIDE 5 MG/ML
5 INJECTION INTRAMUSCULAR; INTRAVENOUS EVERY 6 HOURS PRN
Status: DISCONTINUED | OUTPATIENT
Start: 2021-04-20 | End: 2021-04-24

## 2021-04-20 RX ORDER — UBIDECARENONE 75 MG
100 CAPSULE ORAL DAILY
Status: DISCONTINUED | OUTPATIENT
Start: 2021-04-20 | End: 2021-04-21

## 2021-04-20 RX ORDER — LISINOPRIL 5 MG/1
5 TABLET ORAL DAILY
Status: DISCONTINUED | OUTPATIENT
Start: 2021-04-21 | End: 2021-04-24

## 2021-04-20 RX ORDER — LISINOPRIL 2.5 MG/1
2.5 TABLET ORAL ONCE
Status: COMPLETED | OUTPATIENT
Start: 2021-04-20 | End: 2021-04-20

## 2021-04-20 RX ORDER — FAMOTIDINE 10 MG/ML
20 INJECTION, SOLUTION INTRAVENOUS DAILY
Status: DISCONTINUED | OUTPATIENT
Start: 2021-04-20 | End: 2021-04-24

## 2021-04-20 NOTE — PROGRESS NOTES
1 [facility]  Follow Up     Vidal Cardozo  [id number]  Hospital Day #7  Date of Consult: 04/15/21  Patient seen at: [facility]     Subjective:      Patient was seen  at the bedside, she was lying on her left side sleep 0.5 mg, Oral, Nightly PRN  •  anastrozole (ARIMIDEX) tab 1 mg, 1 mg, Oral, Daily  •  Donepezil HCl (ARICEPT) tab 5 mg, 5 mg, Oral, Nightly  •  escitalopram (LEXAPRO) tab 20 mg, 20 mg, Oral, Daily  •  latanoprost (XALATAN) 0.005 % ophthalmic solution 1 drop Intake Consciousness   100 Full Normal Full   Normal Full   90 Full No disease  Normal Full Normal Full   80 Full Some disease  Normal w/effort Full Normal or  Reduced Full   70 Reduced Some disease  Can’t perform job Full Normal or   Reduced Full   60 Red bed bound or like a vegetable. She has low pain tolerance and she can not take suffering. We are trying to honor her wishes. My sisters are Jehovah's witness and want natural life for her, put things in Gods hands. We do not want any treatment to bring her back. \" evaluation of potential new symptoms do not benefit the patient nor provide quality of life. Consideration for hospice support when comfort care is appropriate.      HCPOA:        Healthcare Agent Appointed: Yes  Healthcare Agent's Name: Cali Rosario

## 2021-04-20 NOTE — PHYSICAL THERAPY NOTE
PHYSICAL THERAPY TREATMENT NOTE - INPATIENT    Room Number: 232/813-A     Session: 3   Number of Visits to Meet Established Goals: 5    Presenting Problem: fall, DKA, UTI    Problem List  Principal Problem:    Diabetic ketoacidosis without coma associated Bearing Restriction: None                PAIN ASSESSMENT   Rating: Unable to rate  Location: epigastric   Management Techniques: Breathing techniques;Repositioning;Relaxation    BALANCE ,  unable to maintain stance and attempting to sit impulsively. Pt reports feeling nauseous , provided emesis basin. Pt with dry heaves. /59. Increased time seated EOB pt cued for breathing, pt states she has pain in epigastric region.    Pt refus established on 4/15/2021; goals in progress 4/20/2021

## 2021-04-20 NOTE — OCCUPATIONAL THERAPY NOTE
OCCUPATIONAL THERAPY TREATMENT NOTE - INPATIENT     Room Number: 841/919-Q  Session: 2   Number of Visits to Meet Established Goals: 7    Presenting Problem: DKA, sepsis d/t UTI, rhabdomyolysis    History related to current admission: Patient is a 79y/o fe GALLBLADDER     • SPECIAL SERVICE OR REPORT  OVER 10 YRS AGO    SLING   • TOTAL ABDOM HYSTERECTOMY         SUBJECTIVE  \"I can't believe the pain. \"  Patient self-stated goal is n/a    OBJECTIVE  Precautions: Bed/chair alarm    WEIGHT BEARING RESTRICTION Supination/pronation 10 reps   Elbow flexion/extension 10 reps   Hand pumps 10 reps     Patient End of Session: Up in chair;Needs met; With [facility] staff;Call light within reach;RN aware of session/findings; All patient questions and concerns addressed;SCDs in

## 2021-04-20 NOTE — PLAN OF CARE
Patient A&Ox 1/2. VS stable, room air and afebrile. Mild complaints of pain, tylenol given. Denies n/v, tolerating diet but poor appetite. QID accuchecks. Possible discharge today. Aspiration and fall precautions with video monitoring in place.   Cont

## 2021-04-20 NOTE — CM/SW NOTE
RN states pt is ready for discharge. FRANCISCO JAVIER spoke with [facility], and they are able to accept pt today. FRANCISCO JAVIER arranged [facility] ambulance, and let Residential palliative care know of discharge. POLST uploaded in [id number].      [facility]  [phone]

## 2021-04-20 NOTE — DIETARY NOTE
[facility]    NUTRITION ASSESSMENT    Pt does not meet malnutrition criteria at this time. NUTRITION INTERVENTION:  1. Meal and Snacks - monitor patient po intake. Encourage adequate po of appropriate diet.   2. Medical Food Supplements - Ensure HP Placed This Encounter      Carbohydrate controlled diet 1800 kcal/60 grams; Fluid Consistency: Thin Liquids ; Texture Consistency: Regular; Is Patient on Accuchecks? Yes; Is Patient on Suicide Precautions?  No     Oral Supplements: Ensure High Protein BID;

## 2021-04-20 NOTE — PROGRESS NOTES
[facility] Hospitalist Progress Note     [facility]      SUBJECTIVE:  Patient had some nausea and emesis yesterday  Having nausea this morning, also notes had abdominal pain earlier  Having regular BMs    OBJECTIVE:  Temp:  [98 °F (36.7 °C)] 98 °F (36.7 °C Recent Labs   Lab 04/19/21  1242 04/19/21  1629 04/19/21  2137 04/20/21  0723 04/20/21  1135   PGLU 166* 121* 108* 125* 131*       Imaging:  XR ANKLE (MIN 3 VIEWS), RIGHT (CPT=73610)    Result Date: 4/13/2021  PROCEDURE:  XR ANKLE (MIN 3 VIEWS), RIGH Basal cisterns are intact. There is no extra cerebral collection. A fracture is not identified. The  sinuses are clear. CONCLUSION:  No acute process.    Dictated by (CST): Bri Majano MD on 4/13/2021 at 2:29 PM     Finalized by (CST): Olga Stringer VIDEO SWALLOW (CPT=74230), 10/17/2014, 9:44 AM.  INDICATIONS:  recent AMS, underlying dementia, r/o aspiration with thin liquids  PATIENT STATED HISTORY: (As transcribed by Technologist)  Assess swallow.    CINE CAPTURES:  8 FLUORSCOPY TIME:  1minute 18seco VIEWS),BILAT(CPT=73523)    Result Date: 4/13/2021  PROCEDURE:  XR HIP W OR WO PELVIS (MIN 5 VIEWS), BILAT (CPT=73523)  TECHNIQUE:  Bilateral min 5 views of the hip and pelvis if performed. COMPARISON:  None.   INDICATIONS:  fall unknown time between bed an PRN  insulin detemir (LEVEMIR) 100 UNIT/ML flextouch 10 Units, 10 Units, Subcutaneous, Daily  lactated ringers infusion, , Intravenous, Continuous  artificial saliva substitute (BIOTENE) solution SOLN, , Oral, PRN  glycerin-Hypromellose- (ARTIFICAL bedtime. Suspect that she may be able to go back to metformin on dc or at snf. Cont ssi.  last A1c 8.8  - abx stopped -- 5 days of tx  - given IVF for rhabdo --> now stopped    # Nausea/Vomiting  - no clear etiology -- not on meds that would expect to cause

## 2021-04-20 NOTE — CM/SW NOTE
RN states pt not ready for discharge due to nausea. Plan for pt to get a CT of abdomen and labs. SW cxl'd transport and updated ETHAN. SW to follow.

## 2021-04-21 ENCOUNTER — APPOINTMENT (OUTPATIENT)
Dept: CT IMAGING | Facility: HOSPITAL | Age: 86
DRG: 638 | End: 2021-04-21
Attending: INTERNAL MEDICINE
Payer: MEDICARE

## 2021-04-21 PROCEDURE — 70450 CT HEAD/BRAIN W/O DYE: CPT | Performed by: INTERNAL MEDICINE

## 2021-04-21 NOTE — CONSULTS
Sohan Craven Patient Status:  Inpatient    7/10/1931 MRN [id number]   [facility] 4NW-A Attending Cielo Mendoza MD   Hosp Day # 8 PCP MD Hayden Garces Lacho Craven is a 80year old female for N V dry he LN   • Type II or unspecified type diabetes mellitus without mention of complication, not stated as uncontrolled    • Unspecified essential hypertension    • Urinary incontinence       Past Surgical History:   Procedure Laterality Date   • CHOLECYSTECTOMY currently tolerating po.    1.  Follow symptoms. 2.  Cont current meds diet. 3.  Reassess symptoms tomorrow. Total time spent on patient care:  32 minutes    cc: Dr. Moran ref.  provider found    The patient indicates understanding of these issues and agr

## 2021-04-21 NOTE — OCCUPATIONAL THERAPY NOTE
OCCUPATIONAL THERAPY TREATMENT NOTE - INPATIENT     Room Number: 963/613-H  Session: 3  Number of Visits to Meet Established Goals: 7    Presenting Problem: DKA, sepsis d/t UTI, rhabdomyolysis    History related to current admission: Patient is a 79y/o fem GALLBLADDER     • SPECIAL SERVICE OR REPORT  OVER 10 YRS AGO    SLING   • TOTAL ABDOM HYSTERECTOMY         SUBJECTIVE  \"I just feel really confused. I feel like I'm going to throw up. \"  Patient self-stated goal is n/a    OBJECTIVE  Precautions: Bed/chair elevated. MIN A with increased time. Once assisted into optimal sitting position EOB, able to maintain sitting balance with SBA to CGA. Facilitated participation in facial hygiene and hair brushing seated EOB, assist needed for thoroughness.    Pt tolerates education  Rehab Potential : Fair  Frequency (Obs): 3-5x/week    ongoing as of 4/21/21  ADL GOALS:  Patient will perform lower body dressing w/ min A and with adaptive equipment PRN  Patient will perform toileting with min A and with adaptive equipment PRN

## 2021-04-21 NOTE — PROGRESS NOTES
[facility] Hospitalist Progress Note     [facility]      SUBJECTIVE:  Patient had some nausea and emesis yesterday  Having nausea this morning, also notes had abdominal pain earlier  Having regular BMs    OBJECTIVE:  Temp:  [98 °F (36.7 °C)-98.1 °F (36.7 ° 04/19/21  0555 04/20/21  1318   ALT 66* 66* 61* 58* 64*   * 131* 84* 65* 58*   ALB 2.5* 2.5* 2.6* 2.7* 2.8*       Recent Labs   Lab 04/20/21  0723 04/20/21  1135 04/20/21  1758 04/20/21  2036 04/21/21  0855   PGLU 125* 131* 100* 85 127*       Imagin Mild generalized cortical atrophy with chronic subcortical microvascular disease noted. No bleed or mass effect. Basal cisterns are intact. There is no extra cerebral collection. A fracture is not identified. The  sinuses are clear.             Kate Queen of varying consistencies was administered orally with patient in lateral projection.   COMPARISON:  [facility] , XR, XR VIDEO SWALLOW (CPT=74230), 10/17/2014, 9:44 AM.  INDICATIONS:  recent AMS, underlying dementia, r/o aspiration with thin liquids  PATIENT STA Finalized by (CST): Ed Carr DO on 4/13/2021 at 1:14 PM       XR HIP W OR WO PELVIS(MIN 5 VIEWS),BILAT(CPT=73523)    Result Date: 4/13/2021  PROCEDURE:  XR HIP W OR WO PELVIS (MIN 5 VIEWS), BILAT (CPT=73523)  TECHNIQUE:  Bilateral min 5 views of the h 10 mg, 10 mg, Intravenous, Q4H PRN  insulin detemir (LEVEMIR) 100 UNIT/ML flextouch 10 Units, 10 Units, Subcutaneous, Daily  lactated ringers infusion, , Intravenous, Continuous  artificial saliva substitute (BIOTENE) solution SOLN, , Oral, PRN  glycerin-H levemir to 10 units at bedtime. Suspect that she may be able to go back to metformin on dc or at SNF. Cont ssi.  last A1c 8.8  - abx stopped -- 5 days of tx  - given IVF for rhabdo --> now stopped    # Nausea/Vomiting  - no clear etiology -- not on meds xiomy

## 2021-04-21 NOTE — SLP NOTE
SPEECH DAILY NOTE - INPATIENT    ASSESSMENT & PLAN   ASSESSMENT  Pt seen for dysphagia tx to assess tolerance with recommended diet, ensure proper utilization of aspiration precautions and provide pt/family education.   Patient alert in bed and agreeable to Progress   Goal #4 VFSS to be completed Goal met        FOLLOW UP  Follow Up Needed (Documentation Required): Yes  SLP Follow-up Date: 04/22/21  Number of Visits to Meet Established Goals: 2    Session: 3    Prior to entering room, SLP donned appropriate P

## 2021-04-21 NOTE — PLAN OF CARE
Pt A/O x1-2, forgetful requiring frequent reorientation. Vitals stable. Afebrile. CT abd/pelvis done. Blood sugar 85 this evening. Meds given in applesauce and popsicle given to patient. Fall precautions in place. Call light within reach.      Problem: Diab thought processing and/or memory  Description: Interventions:  Outcome: Progressing     Problem: Safety Risk - Non-Violent Restraints  Goal: Patient will remain free from self-harm  Description: INTERVENTIONS:  - Apply the least restrictive restraint to pr

## 2021-04-21 NOTE — PHYSICAL THERAPY NOTE
PHYSICAL THERAPY TREATMENT NOTE - INPATIENT    Room Number: 796/848-Q     Session: 4  Number of Visits to Meet Established Goals: 5    Presenting Problem: fall, DKA, UTI    Problem List  Principal Problem:    Diabetic ketoacidosis without coma associated Restriction: None                PAIN ASSESSMENT   Rating: Unable to rate  Location: epigastric   Management Techniques: Breathing techniques; Body mechanics; Activity promotion;Relaxation;Repositioning    BALANCE cued to scoot toward [facility] and is unable to complete. Sit>sup mod A cues for sequencing. TA to reposition in bed. Pt left in bed, needs met,MD arriving end of session, and notified of above.      THERAPEUTIC EXERCISES  Lower Extremity Alternating marching

## 2021-04-22 NOTE — PROGRESS NOTES
Patient alert times two, able to state name, patient states, \"Im in the hospital,\" patient requires frequent reorientation. Patient incontient of urine, brief changed.

## 2021-04-22 NOTE — PHYSICAL THERAPY NOTE
PHYSICAL THERAPY TREATMENT NOTE - INPATIENT    Room Number: 113/075-Z     Session: 5  Number of Visits to Meet Established Goals: 5    Presenting Problem: fall, DKA, UTI    Problem List  Principal Problem:    Diabetic ketoacidosis without coma associated alarm    WEIGHT BEARING RESTRICTION  Weight Bearing Restriction: None                PAIN ASSESSMENT   Rating: Unable to rate  Location: no c/o  Management Techniques: Breathing techniques; Body mechanics; Activity promotion;Relaxation;Repositioning    RITU foot flat and WB on L with good return demo. Pt continues to report numbness L foot. Pt gait trained side steps along side of bed, mod A x 2, for weight shift to offload and for RW management. Stand>sit min A for eccentric control.   Sit>stand mod A x 2, p demonstrate transfers EOB to/from [facility] at assistance level: moderate assistance      Goal #3 Patient is able to ambulate 20 feet with assist device: walker - rolling at assistance level: moderate assistance      Goal #4     Goal #5     Goal #6     Goal Comm

## 2021-04-22 NOTE — PROGRESS NOTES
[facility]  Progress Note    Aarti Craven Patient Status:  Inpatient    7/10/1931 MRN [id number]   [facility] 4NW-A Attending Blake Smith MD   Hosp Day # 9 PCP Juliocesar Peterson MD     Subjective:  Aarti Craven is a(n) 80 without coma ([facility]     Diabetic ketoacidosis without coma associated with other specified diabetes mellitus ([facility]     Non-traumatic rhabdomyolysis     Leukocytosis, unspecified type     ROSA (acute kidney injury) ([facility]     Sepsis secondary to UTI ([facility]

## 2021-04-22 NOTE — PLAN OF CARE
Pt A/O x1-2, forgetful- requiring frequent reorientation. Vitals stable. Afebrile. Pt denies pain. Evening blood sugar 94, patient given popsicle to eat. Incontinent, changed, bathed and repositioned. Fall precautions in place. Frequent rounding done. attention, thought processing and/or memory  Description: Interventions:  Outcome: Progressing     Problem: Safety Risk - Non-Violent Restraints  Goal: Patient will remain free from self-harm  Description: INTERVENTIONS:  - Apply the least restrictive rest

## 2021-04-22 NOTE — CM/SW NOTE
Care Progression Note:  Active Acute Medical Issue:   Diabetic ketoacidosis without coma associated with other specified diabetes mellitus ([facility]   Rhabdomyolysis after fall at home, last   UTI and dehydration  Per RN during rounds, pt continues to ha

## 2021-04-22 NOTE — OCCUPATIONAL THERAPY NOTE
OCCUPATIONAL THERAPY TREATMENT NOTE - INPATIENT     Room Number: 405/405-A  Session: 1   Number of Visits to Meet Established Goals: 7    Presenting Problem: DKA, sepsis d/t UTI, rhabdomyolysis    History related to current admission: Patient is a 79y/o fe GALLBLADDER     • SPECIAL SERVICE OR REPORT  OVER 10 YRS AGO    SLING   • TOTAL ABDOM HYSTERECTOMY         SUBJECTIVE  Pt stated, \"I do not know what I am supposed to do. \"    Patient self-stated goal is to go home    OBJECTIVE  Precautions: Bed/chair ala left sitting in chair with alarm on. Pt educated on OOB activity and roles of OT and goals of OT. Patient End of Session: Up in chair;Needs met;Call light within reach; All patient questions and concerns addressed;SCDs in place; Alarm set    ASSESSMENT   P

## 2021-04-22 NOTE — PROGRESS NOTES
[facility]  Progress Note    Catrina Craven Patient Status:  Inpatient    7/10/1931 MRN [id number]   [facility] 4SW-A Attending Wesley Cruz MD   Hosp Day # 9 PCP Mile Rubio MD     ADMISSION DATE AND TIME: 2021 11:23 Value Ref Range    Phosphorus 2.5 2.5 - 4.9 mg/dL   POCT GLUCOSE    Collection Time: 04/21/21  8:55 AM   Result Value Ref Range    POC Glucose 127 (H) 70 - 99 mg/dL   POCT GLUCOSE    Collection Time: 04/21/21 12:05 PM   Result Value Ref Range    POC Glucos in cooperation with the speech pathologist.  Barium of varying consistencies was administered orally with patient in lateral projection.   COMPARISON:  [facility] , XR, XR VIDEO SWALLOW (CPT=74230), 10/17/2014, 9:44 AM.  INDICATIONS:  recent AMS, underlying dem Regions of enthesopathy along the pelvis and hips bilaterally as described above. Dictated by (CST): Lonnie Marcano DO on 4/13/2021 at 1:10 PM     Finalized by (REA): Lonnie Marcano DO on 4/13/2021 at 1:12 PM             MEDICATIONS:  Medications reviewed. unchanged, with a long axis diameter of 8 mm. An aortocaval node on image 159 has a short axis diameter of 8 mm, not significantly changed.      A left common iliac node has a short axis diameter of 6 mm, slightly smaller   than on the previous exam. A righ

## 2021-04-22 NOTE — SLP NOTE
SPEECH DAILY NOTE - INPATIENT    ASSESSMENT & PLAN   ASSESSMENT  Pt seen for dysphagia tx to assess tolerance with recommended diet, ensure proper utilization of aspiration precautions and provide pt/family education.   SLP observed patient consuming items aspiration precautions and swallow strategies independently over 1-2 session(s).   Goal Met   Goal #4 VFSS to be completed Goal met          FOLLOW UP  Follow Up Needed (Documentation Required): No  SLP Follow-up Date: 04/22/21  Number of Visits to Meet Est

## 2021-04-23 PROCEDURE — 0DJ08ZZ INSPECTION OF UPPER INTESTINAL TRACT, VIA NATURAL OR ARTIFICIAL OPENING ENDOSCOPIC: ICD-10-PCS | Performed by: INTERNAL MEDICINE

## 2021-04-23 RX ORDER — MIDAZOLAM HYDROCHLORIDE 1 MG/ML
INJECTION INTRAMUSCULAR; INTRAVENOUS
Status: DISCONTINUED | OUTPATIENT
Start: 2021-04-23 | End: 2021-04-23 | Stop reason: HOSPADM

## 2021-04-23 NOTE — PROGRESS NOTES
[facility] Hospitalist Progress Note     SUBJECTIVE:    Going for egd.             OBJECTIVE:  Temp:  [97.6 °F (36.4 °C)-98 °F (36.7 °C)] 98 °F (36.7 °C)  Pulse:  [59-87] 77  Resp:  [16-20] 20  BP: (128-172)/(46-99) 150/55    Exam  Gen: No acute distress, alert 10 mg, Intravenous, Q4H PRN  insulin detemir (LEVEMIR) 100 UNIT/ML flextouch 10 Units, 10 Units, Subcutaneous, Daily  lactated ringers infusion, , Intravenous, Continuous  artificial saliva substitute (BIOTENE) solution SOLN, , Oral, PRN  glycerin-Hypromel levemir to 10 units at bedtime. Suspect that she may be able to go back to metformin on dc or at SNF. Cont ssi.  last A1c 8.8  - abx stopped -- 5 days of tx  - given IVF for rhabdo --> now stopped    # Nausea/Vomiting  - no clear etiology -- not on meds xiomy

## 2021-04-23 NOTE — PROGRESS NOTES
Patient was up in chair total michaela lift, denies any pain, fair appetite. Up in chair for two hours, and tolerated well. Patient spoke to her friend, Christina Ojeda today on phone. High fall risk. Requires frequent reoirnetation, is able to state name.

## 2021-04-23 NOTE — OPERATIVE REPORT
Sohan Craven Patient Status:  Inpatient    7/10/1931 MRN [id number]   Location [id number] [place] [address] Attending Getachew Christianson MD   Hosp Day # 10 PCP Lauri Argueta MD         PATIENT NAME: Sanabria Adelina

## 2021-04-23 NOTE — PLAN OF CARE
Pt received alert and oriented x2. Vital signs stable. No complaints of pain. Incontinent of bowel and bladder. NPO for EGD this afternoon. Phone consent obtained from daughter Jayden Ramos. Christie area/buttocks red and excoriated, barrier cream applied.  IVF in

## 2021-04-23 NOTE — PLAN OF CARE
A&Ox1, disoriented to place, situation and time. Follows commands and is able to have a coherent conversation, confused and requires frequent orientation. RA, no tele, refused SCDs. Took pills whole w/ apple sauce.  CO of HA, tx w/ prn tylenol per [facility] w/ re soon as possible  - Assess the patient's physical comfort, circulation, skin condition, hydration, nutrition and elimination needs   - Reorient and redirection as needed  - Assess for the need to continue restraints  Outcome: Progressing

## 2021-04-23 NOTE — DIETARY NOTE
[facility]    NUTRITION ASSESSMENT    Pt does not meet malnutrition criteria at this time. NUTRITION INTERVENTION:  1. Meal and Snacks - monitor patient po intake. Encourage adequate po of appropriate diet.   2. Medical Food Supplements - Ensure HP (166 lb 3.2 oz)  03/22/21 : 74.8 kg (165 lb)  03/16/21 : 75 kg (165 lb 5.5 oz)  01/21/21 : 74.4 kg (164 lb)  12/28/20 : 74.8 kg (164 lb 14.4 oz)  10/30/20 : 75.9 kg (167 lb 6.4 oz)  10/07/20 : 73.5 kg (162 lb)  09/04/20 : 73.9 kg (162 lb 14.4 oz)  08/20/20 LDN  Clinical Dietitian

## 2021-04-24 VITALS
HEIGHT: 63 IN | TEMPERATURE: 99 F | HEART RATE: 72 BPM | RESPIRATION RATE: 18 BRPM | DIASTOLIC BLOOD PRESSURE: 57 MMHG | BODY MASS INDEX: 31.1 KG/M2 | SYSTOLIC BLOOD PRESSURE: 152 MMHG | OXYGEN SATURATION: 92 % | WEIGHT: 175.5 LBS

## 2021-04-24 RX ORDER — LISINOPRIL 2.5 MG/1
5 TABLET ORAL DAILY
Qty: 90 TABLET | Refills: 3 | Status: SHIPPED | OUTPATIENT
Start: 2021-04-24

## 2021-04-24 NOTE — CM/SW NOTE
04/24/21 1300   Discharge disposition   Expected discharge disposition Skilled Nurs   Name of [id number]   Patient is Discharged to a [address] [place] Yes   Discharge transportation [address] [facility]     Pt stable to

## 2021-04-24 NOTE — DISCHARGE SUMMARY
General Medicine Discharge Summary     Patient ID:  Jeremy Craven  80year old  7/10/1931    Admit date: 4/13/2021    Discharge date and time:4/24/2021    Attending Physician: Jasmeet Augustin MD head  - onc c/s    # Rhabdomyolysis   - likely due to prolonged immobility   - resolved w IVF    # Essential HTN  - ace increased at dc  - monitor BP     # Dementia without behavioral disturbance  - cont donepezil      # Breast CA  - cont anastrozole, appr oriented x 3  Heart: RRR  Lungs: clear bilaterally, no active wheezing  Abdomen: nontender, nondistended, intact BS  Extremities: no pedal edema   Neuro: CN inact, no focal deficits      Total time coordinating care for discharge: Greater than 30 minutes

## 2021-04-24 NOTE — PLAN OF CARE
Alert and oriented x 1-2. Forgetful, needs frequent reorientation. VSS. Afebrile. No c/o pain or SOB. Blood sugar 125 this evening, no coverage needed. Incontinent. Resting comfortably, will monitor.

## 2021-04-24 NOTE — PROGRESS NOTES
Discharged patient to [facility] per ambulance in stable condition,Saline lock removed,  Dc instruction given to support staff,Daughter [address] of transfer,Verbalized needs.

## 2021-05-27 ENCOUNTER — HOSPITAL ENCOUNTER (INPATIENT)
Facility: HOSPITAL | Age: 86
LOS: 4 days | Discharge: NURSING, REHAB OR LONG-TERM CARE | DRG: 603 | End: 2021-06-01
Attending: EMERGENCY MEDICINE | Admitting: INTERNAL MEDICINE
Payer: MEDICARE

## 2021-05-27 ENCOUNTER — APPOINTMENT (OUTPATIENT)
Dept: ULTRASOUND IMAGING | Facility: HOSPITAL | Age: 86
DRG: 603 | End: 2021-05-27
Attending: EMERGENCY MEDICINE
Payer: MEDICARE

## 2021-05-27 ENCOUNTER — APPOINTMENT (OUTPATIENT)
Dept: NUCLEAR MEDICINE | Facility: HOSPITAL | Age: 86
DRG: 603 | End: 2021-05-27
Attending: EMERGENCY MEDICINE
Payer: MEDICARE

## 2021-05-27 ENCOUNTER — APPOINTMENT (OUTPATIENT)
Dept: GENERAL RADIOLOGY | Facility: HOSPITAL | Age: 86
DRG: 603 | End: 2021-05-27
Attending: EMERGENCY MEDICINE
Payer: MEDICARE

## 2021-05-27 DIAGNOSIS — R07.9 CHEST PAIN WITH LOW TO INTERMEDIATE PROBABILITY OF PULMONARY EMBOLISM: Primary | ICD-10-CM

## 2021-05-27 DIAGNOSIS — L03.115 CELLULITIS OF RIGHT LOWER EXTREMITY: ICD-10-CM

## 2021-05-27 DIAGNOSIS — R09.02 HYPOXIA: ICD-10-CM

## 2021-05-27 PROCEDURE — 85025 COMPLETE CBC W/AUTO DIFF WBC: CPT | Performed by: EMERGENCY MEDICINE

## 2021-05-27 PROCEDURE — 99285 EMERGENCY DEPT VISIT HI MDM: CPT

## 2021-05-27 PROCEDURE — 85379 FIBRIN DEGRADATION QUANT: CPT | Performed by: EMERGENCY MEDICINE

## 2021-05-27 PROCEDURE — 78582 LUNG VENTILAT&PERFUS IMAGING: CPT | Performed by: EMERGENCY MEDICINE

## 2021-05-27 PROCEDURE — 96365 THER/PROPH/DIAG IV INF INIT: CPT

## 2021-05-27 PROCEDURE — 93005 ELECTROCARDIOGRAM TRACING: CPT

## 2021-05-27 PROCEDURE — 93010 ELECTROCARDIOGRAM REPORT: CPT

## 2021-05-27 PROCEDURE — 80053 COMPREHEN METABOLIC PANEL: CPT | Performed by: EMERGENCY MEDICINE

## 2021-05-27 PROCEDURE — 71045 X-RAY EXAM CHEST 1 VIEW: CPT | Performed by: EMERGENCY MEDICINE

## 2021-05-27 PROCEDURE — 85610 PROTHROMBIN TIME: CPT | Performed by: EMERGENCY MEDICINE

## 2021-05-27 PROCEDURE — 93971 EXTREMITY STUDY: CPT | Performed by: EMERGENCY MEDICINE

## 2021-05-28 PROBLEM — R09.02 HYPOXIA: Status: ACTIVE | Noted: 2021-05-28

## 2021-05-28 PROBLEM — R07.9: Status: ACTIVE | Noted: 2021-05-28

## 2021-05-28 PROBLEM — R79.89 AZOTEMIA: Status: ACTIVE | Noted: 2021-05-28

## 2021-05-28 PROBLEM — R73.9 HYPERGLYCEMIA: Status: ACTIVE | Noted: 2021-05-28

## 2021-05-28 PROBLEM — D64.9 ANEMIA: Status: ACTIVE | Noted: 2021-05-28

## 2021-05-28 PROBLEM — L03.115 CELLULITIS OF RIGHT LOWER EXTREMITY: Status: ACTIVE | Noted: 2021-05-28

## 2021-05-28 PROBLEM — E87.1 HYPONATREMIA: Status: ACTIVE | Noted: 2021-05-28

## 2021-05-28 PROCEDURE — 85025 COMPLETE CBC W/AUTO DIFF WBC: CPT | Performed by: INTERNAL MEDICINE

## 2021-05-28 PROCEDURE — 85730 THROMBOPLASTIN TIME PARTIAL: CPT | Performed by: EMERGENCY MEDICINE

## 2021-05-28 PROCEDURE — 82962 GLUCOSE BLOOD TEST: CPT

## 2021-05-28 PROCEDURE — 85730 THROMBOPLASTIN TIME PARTIAL: CPT | Performed by: HOSPITALIST

## 2021-05-28 PROCEDURE — 87040 BLOOD CULTURE FOR BACTERIA: CPT | Performed by: HOSPITALIST

## 2021-05-28 PROCEDURE — 80048 BASIC METABOLIC PNL TOTAL CA: CPT | Performed by: INTERNAL MEDICINE

## 2021-05-28 RX ORDER — LATANOPROST 50 UG/ML
1 SOLUTION/ DROPS OPHTHALMIC NIGHTLY
Status: DISCONTINUED | OUTPATIENT
Start: 2021-05-28 | End: 2021-06-01

## 2021-05-28 RX ORDER — DONEPEZIL HYDROCHLORIDE 10 MG/1
10 TABLET, FILM COATED ORAL DAILY
Status: DISCONTINUED | OUTPATIENT
Start: 2021-05-28 | End: 2021-06-01

## 2021-05-28 RX ORDER — HEPARIN SODIUM 5000 [USP'U]/ML
80 INJECTION INTRAVENOUS; SUBCUTANEOUS ONCE
Status: COMPLETED | OUTPATIENT
Start: 2021-05-28 | End: 2021-05-28

## 2021-05-28 RX ORDER — METOCLOPRAMIDE HYDROCHLORIDE 5 MG/ML
5 INJECTION INTRAMUSCULAR; INTRAVENOUS EVERY 8 HOURS PRN
Status: DISCONTINUED | OUTPATIENT
Start: 2021-05-28 | End: 2021-06-01

## 2021-05-28 RX ORDER — CYCLOBENZAPRINE HCL 5 MG
5 TABLET ORAL 2 TIMES DAILY PRN
Status: DISCONTINUED | OUTPATIENT
Start: 2021-05-28 | End: 2021-06-01

## 2021-05-28 RX ORDER — GABAPENTIN 300 MG/1
300 CAPSULE ORAL 3 TIMES DAILY
Status: DISCONTINUED | OUTPATIENT
Start: 2021-05-28 | End: 2021-06-01

## 2021-05-28 RX ORDER — HYDROMORPHONE HYDROCHLORIDE 1 MG/ML
0.5 INJECTION, SOLUTION INTRAMUSCULAR; INTRAVENOUS; SUBCUTANEOUS EVERY 30 MIN PRN
Status: ACTIVE | OUTPATIENT
Start: 2021-05-28 | End: 2021-05-28

## 2021-05-28 RX ORDER — ESCITALOPRAM OXALATE 20 MG/1
20 TABLET ORAL DAILY
Status: DISCONTINUED | OUTPATIENT
Start: 2021-05-28 | End: 2021-06-01

## 2021-05-28 RX ORDER — GABAPENTIN 300 MG/1
300 CAPSULE ORAL 3 TIMES DAILY
COMMUNITY

## 2021-05-28 RX ORDER — ALPRAZOLAM 1 MG/1
1 TABLET ORAL NIGHTLY PRN
Status: DISCONTINUED | OUTPATIENT
Start: 2021-05-28 | End: 2021-06-01

## 2021-05-28 RX ORDER — HEPARIN SODIUM AND DEXTROSE 10000; 5 [USP'U]/100ML; G/100ML
INJECTION INTRAVENOUS CONTINUOUS
Status: DISCONTINUED | OUTPATIENT
Start: 2021-05-28 | End: 2021-05-28

## 2021-05-28 RX ORDER — METHYLPREDNISOLONE SODIUM SUCCINATE 40 MG/ML
40 INJECTION, POWDER, LYOPHILIZED, FOR SOLUTION INTRAMUSCULAR; INTRAVENOUS EVERY 6 HOURS
Status: COMPLETED | OUTPATIENT
Start: 2021-05-29 | End: 2021-05-29

## 2021-05-28 RX ORDER — ACETAMINOPHEN 325 MG/1
650 TABLET ORAL EVERY 6 HOURS PRN
Status: DISCONTINUED | OUTPATIENT
Start: 2021-05-28 | End: 2021-06-01

## 2021-05-28 RX ORDER — DIPHENHYDRAMINE HYDROCHLORIDE 50 MG/ML
50 INJECTION INTRAMUSCULAR; INTRAVENOUS ONCE
Status: DISCONTINUED | OUTPATIENT
Start: 2021-05-28 | End: 2021-05-28

## 2021-05-28 RX ORDER — ALPRAZOLAM 0.25 MG/1
0.25 TABLET ORAL DAILY PRN
Status: DISCONTINUED | OUTPATIENT
Start: 2021-05-28 | End: 2021-06-01

## 2021-05-28 RX ORDER — ALPRAZOLAM 0.25 MG/1
0.25 TABLET ORAL AS NEEDED
Status: ON HOLD | COMMUNITY
End: 2021-06-01

## 2021-05-28 RX ORDER — ENOXAPARIN SODIUM 100 MG/ML
1 INJECTION SUBCUTANEOUS EVERY 12 HOURS SCHEDULED
Status: DISCONTINUED | OUTPATIENT
Start: 2021-05-28 | End: 2021-05-30

## 2021-05-28 RX ORDER — POLYETHYLENE GLYCOL 3350 17 G/17G
17 POWDER, FOR SOLUTION ORAL DAILY PRN
Status: DISCONTINUED | OUTPATIENT
Start: 2021-05-28 | End: 2021-06-01

## 2021-05-28 RX ORDER — HEPARIN SODIUM AND DEXTROSE 10000; 5 [USP'U]/100ML; G/100ML
18 INJECTION INTRAVENOUS ONCE
Status: COMPLETED | OUTPATIENT
Start: 2021-05-28 | End: 2021-05-28

## 2021-05-28 RX ORDER — ACETAMINOPHEN 325 MG/1
650 TABLET ORAL EVERY 6 HOURS PRN
COMMUNITY

## 2021-05-28 RX ORDER — METHYLPREDNISOLONE SODIUM SUCCINATE 40 MG/ML
40 INJECTION, POWDER, LYOPHILIZED, FOR SOLUTION INTRAMUSCULAR; INTRAVENOUS EVERY 6 HOURS
Status: DISCONTINUED | OUTPATIENT
Start: 2021-05-28 | End: 2021-05-28

## 2021-05-28 RX ORDER — FLUTICASONE PROPIONATE 50 MCG
1 SPRAY, SUSPENSION (ML) NASAL
Status: DISCONTINUED | OUTPATIENT
Start: 2021-05-28 | End: 2021-06-01

## 2021-05-28 RX ORDER — ONDANSETRON 2 MG/ML
4 INJECTION INTRAMUSCULAR; INTRAVENOUS EVERY 6 HOURS PRN
Status: DISCONTINUED | OUTPATIENT
Start: 2021-05-28 | End: 2021-06-01

## 2021-05-28 RX ORDER — DEXTROSE MONOHYDRATE 25 G/50ML
50 INJECTION, SOLUTION INTRAVENOUS
Status: DISCONTINUED | OUTPATIENT
Start: 2021-05-28 | End: 2021-06-01

## 2021-05-28 RX ORDER — CEPHALEXIN 500 MG/1
500 CAPSULE ORAL EVERY 12 HOURS
Status: ON HOLD | COMMUNITY
Start: 2021-05-26 | End: 2021-06-01

## 2021-05-28 RX ORDER — ONDANSETRON 4 MG/1
4 TABLET, FILM COATED ORAL EVERY 6 HOURS PRN
COMMUNITY

## 2021-05-28 RX ORDER — DONEPEZIL HYDROCHLORIDE 10 MG/1
10 TABLET, FILM COATED ORAL DAILY
COMMUNITY

## 2021-05-28 RX ORDER — BISACODYL 10 MG
10 SUPPOSITORY, RECTAL RECTAL
Status: DISCONTINUED | OUTPATIENT
Start: 2021-05-28 | End: 2021-06-01

## 2021-05-28 RX ORDER — TRAMADOL HYDROCHLORIDE 50 MG/1
50 TABLET ORAL EVERY 6 HOURS PRN
Status: DISCONTINUED | OUTPATIENT
Start: 2021-05-28 | End: 2021-06-01

## 2021-05-28 RX ORDER — ANASTROZOLE 1 MG/1
1 TABLET ORAL DAILY
Status: DISCONTINUED | OUTPATIENT
Start: 2021-05-28 | End: 2021-06-01

## 2021-05-28 RX ORDER — CYCLOBENZAPRINE HCL 5 MG
5 TABLET ORAL 2 TIMES DAILY PRN
COMMUNITY

## 2021-05-28 RX ORDER — CETIRIZINE HYDROCHLORIDE 10 MG/1
10 TABLET ORAL DAILY
Refills: 3 | Status: DISCONTINUED | OUTPATIENT
Start: 2021-05-28 | End: 2021-06-01

## 2021-05-28 RX ORDER — ONDANSETRON 2 MG/ML
4 INJECTION INTRAMUSCULAR; INTRAVENOUS EVERY 4 HOURS PRN
Status: DISCONTINUED | OUTPATIENT
Start: 2021-05-28 | End: 2021-05-28

## 2021-05-28 RX ORDER — ONDANSETRON 4 MG/1
4 TABLET, FILM COATED ORAL EVERY 6 HOURS PRN
Status: DISCONTINUED | OUTPATIENT
Start: 2021-05-28 | End: 2021-06-01

## 2021-05-28 RX ORDER — ALPRAZOLAM 0.25 MG/1
0.25 TABLET ORAL DAILY
Status: DISCONTINUED | OUTPATIENT
Start: 2021-05-28 | End: 2021-06-01

## 2021-05-28 RX ORDER — LISINOPRIL 5 MG/1
5 TABLET ORAL DAILY
Status: DISCONTINUED | OUTPATIENT
Start: 2021-05-28 | End: 2021-06-01

## 2021-05-28 RX ORDER — DOCUSATE SODIUM 100 MG/1
100 CAPSULE, LIQUID FILLED ORAL 2 TIMES DAILY
Status: DISCONTINUED | OUTPATIENT
Start: 2021-05-28 | End: 2021-06-01

## 2021-05-28 RX ORDER — TRAMADOL HYDROCHLORIDE 50 MG/1
50 TABLET ORAL 3 TIMES DAILY PRN
Status: DISCONTINUED | OUTPATIENT
Start: 2021-05-28 | End: 2021-05-28

## 2021-05-28 RX ORDER — TRAMADOL HYDROCHLORIDE 50 MG/1
50 TABLET ORAL 3 TIMES DAILY PRN
Status: ON HOLD | COMMUNITY
End: 2021-06-01

## 2021-05-28 RX ORDER — ALPRAZOLAM 0.25 MG/1
0.25 TABLET ORAL DAILY
Status: ON HOLD | COMMUNITY
End: 2021-06-01

## 2021-05-28 RX ORDER — DIPHENHYDRAMINE HYDROCHLORIDE 50 MG/ML
50 INJECTION INTRAMUSCULAR; INTRAVENOUS ONCE
Status: COMPLETED | OUTPATIENT
Start: 2021-05-29 | End: 2021-05-29

## 2021-05-28 RX ORDER — ACETAMINOPHEN 325 MG/1
650 TABLET ORAL EVERY 6 HOURS PRN
Status: DISCONTINUED | OUTPATIENT
Start: 2021-05-28 | End: 2021-05-28

## 2021-05-28 RX ORDER — NYSTATIN 100000 [USP'U]/G
POWDER TOPICAL 2 TIMES DAILY
COMMUNITY

## 2021-05-28 RX ORDER — SODIUM PHOSPHATE, DIBASIC AND SODIUM PHOSPHATE, MONOBASIC 7; 19 G/133ML; G/133ML
1 ENEMA RECTAL ONCE AS NEEDED
Status: DISCONTINUED | OUTPATIENT
Start: 2021-05-28 | End: 2021-06-01

## 2021-05-28 NOTE — H&P
.  CC: Patient presents with:  Fever  Cellulitis       PCP: Mika Toledo MD    History of Present Illness: Patient is a 80year old female who presented from SNF with fever and cellulitis.  She has h/o diabetes, breast cancer on anastrazole and fa HISTORY  8-5-2011    Cysto / Flow us - Dr. Caballero Cocoa   • REMOVAL GALLBLADDER     • SPECIAL SERVICE OR REPORT  OVER 10 YRS AGO    SLING   • TOTAL ABDOM HYSTERECTOMY          ALL:    Iodine Tincture         HIVES  Propoxyphene            PALPITATIONS  Voltaren Tab tab, Take 1 mg by mouth daily. , Disp: , Rfl:   escitalopram 20 MG Oral Tab, Take 20 mg by mouth daily. , Disp: , Rfl:   Fluticasone Propionate 50 MCG/ACT Nasal Suspension, 1 spray by Each Nare route daily as needed.   , Disp: , Rfl:   metFORMIN HCl 1000 05/28/21  0814   * 136   K 4.2 4.0   CL 99 101   CO2 30.0 28.0   BUN 16 13   CREATSERUM 0.76 0.70   * 161*   CA 9.4 9.2       Recent Labs   Lab 05/27/21 2119   ALT 17   AST 15   ALB 3.0*       No results for input(s): TROP in the last 168 juan ramon PATIENT STATED HISTORY: (As transcribed by Technologist)  Patient offered no additional history at this time. FINDINGS:  Normal heart size and pulmonary vascularity. No pleural effusion or pneumothorax. No lobar consolidation.   Biopsy markers along the records reviewed--    ASSESSMENT / PLAN:     1) RLE cellulitis- has patchy area extending behind R ankle as well as erythema surrounding wounds.   - xray last month was okay  - may have been on keflex recently at [facility]  - will cont cefazolin here for now  - s

## 2021-05-28 NOTE — CM/SW NOTE
FRANCISCO JAVIER received call from Judith at [facility], she stated that pt is approved to come to the facility after hospital discharge. Will need to upload PT/OT evaluations once available.      If pt discharges over the weekend, FRANCISCO JAVIER/ANA to call Judith at [phone]

## 2021-05-28 NOTE — PHYSICAL THERAPY NOTE
PT order received, chart reviewed. Pt with V/Q scan indeterminate for PE, to undergo CTA to r/o. Will f/u at a later date once results are available and after 24hrs of increased anticoagulation if + results per APTA guidelines.

## 2021-05-28 NOTE — ED PROVIDER NOTES
Patient Seen in: [facility] Emergency Department      History   Patient presents with:  Fever  Cellulitis    Stated Complaint: fever, cellulitis left foot    HPI/Subjective:   HPI  Is a pleasant 80-year-old female sent here from the [facility] nursing SERVICE OR REPORT  OVER 10 YRS AGO    SLING   • TOTAL ABDOM HYSTERECTOMY                  Social History    Tobacco Use      Smoking status: Never Smoker      Smokeless tobacco: Never Used      Tobacco comment: Per pt used to live with a smoker until 1995. exudates. Pulses are +2. Left foot pulses +2. There is some swelling of the left foot and left calf with some mild tenderness to palpation along the left calf and posterior to the left knee.        ED Course     Labs Reviewed   COMP METABOLIC PANEL (14) Doppler spectral analysis, and color flow.  Doppler imaging were performed.  The   following veins were imaged:  Common, deep, and superficial femoral, popliteal, sapheno-femoral junction, posterior tibial veins, and the contralateral common femoral vein. started to drop. She was placed on 2 L of oxygen. A chest x-ray was undertaken to assess for any infiltrative process. Chest x-ray showed no active cardiopulmonary process identified.   She was sent for an ultrasound of the left lower extremity to rule o

## 2021-05-28 NOTE — PROGRESS NOTES
Pharmacy Note: Renal dose adjustment for Metoclopramide (Reglan)  Ab Craven has been prescribed Metoclopramide (Reglan) 10 mg every 8 hours as needed. Estimated Creatinine Clearance: 37.9 mL/min (based on SCr of 0.76 mg/dL).     Her calculated creat

## 2021-05-28 NOTE — CM/SW NOTE
05/28/21 1100   CM/SW Referral Data   Referral Source Social Work (self-referral)   Reason for Referral Discharge planning;Psychoscial assessment   Informant Children  (Daughter - Davion Brown)   Patient Status Prior to Admission   Services in place prior to a

## 2021-05-29 ENCOUNTER — APPOINTMENT (OUTPATIENT)
Dept: GENERAL RADIOLOGY | Facility: HOSPITAL | Age: 86
DRG: 603 | End: 2021-05-29
Attending: HOSPITALIST
Payer: MEDICARE

## 2021-05-29 ENCOUNTER — APPOINTMENT (OUTPATIENT)
Dept: CT IMAGING | Facility: HOSPITAL | Age: 86
DRG: 603 | End: 2021-05-29
Attending: HOSPITALIST
Payer: MEDICARE

## 2021-05-29 PROCEDURE — 73630 X-RAY EXAM OF FOOT: CPT | Performed by: HOSPITALIST

## 2021-05-29 PROCEDURE — 82962 GLUCOSE BLOOD TEST: CPT

## 2021-05-29 PROCEDURE — 71275 CT ANGIOGRAPHY CHEST: CPT | Performed by: HOSPITALIST

## 2021-05-29 PROCEDURE — 73610 X-RAY EXAM OF ANKLE: CPT | Performed by: HOSPITALIST

## 2021-05-29 PROCEDURE — 80048 BASIC METABOLIC PNL TOTAL CA: CPT | Performed by: HOSPITALIST

## 2021-05-29 NOTE — PLAN OF CARE
Assumed care this am. Pt alert and oriented to person only, very forgetful. RA maintaining SPO2 above 92%. VS stable. C/o of pain in L foot and leg - given PRN pain medication and lidocaine patches - see MAR.  BLE neuropathy R ankle and shine scabbed wounds Progressing

## 2021-05-29 NOTE — PHYSICAL THERAPY NOTE
PT order received, chart reviewed. Pt with V/Q scan indeterminate for PE, to undergo CTA today to r/o.  Will f/u at a later time once results are available and after 24hrs of increased anticoagulation if + results per APTA guidelines

## 2021-05-29 NOTE — PLAN OF CARE
Alert. Oriented to person only. Follows commands. Cooperative. S.tach per tele. Hr 100s. Low grade temp 99.1. on iv ancef q 12hr. Being prep for cta chest w/ iv steroids. Incontinent. Rt lower leg scab noted w/ some redness. Made comfortable in bed.  Call l

## 2021-05-29 NOTE — PLAN OF CARE
Pt Aox 2-3, RA, denies pain; appears comfortable. Foot/ankle xray -. CTA: - for PE. Vitals stable. Pts Bs high; hospitalist aware. Vitals stable. Will continue to monitor closely.     Problem: SKIN/TISSUE INTEGRITY - ADULT  Goal: Incision(s), wounds(s) or d puncture sites for bleeding and/or hematoma  - Assess quality of pulses, skin color and temperature  - Assess for signs of decreased coronary artery perfusion - ex.  Angina  - Evaluate fluid balance, assess for edema, trend weights  Outcome: Progressing  Go and optimal renal function maintained  Description: INTERVENTIONS:  - Monitor labs and assess for signs and symptoms of volume excess or deficit  - Monitor intake, output and patient weight  - Monitor urine specific gravity, serum osmolarity and serum sodi Administer growth factors as ordered  - Implement neutropenic guidelines  Outcome: Progressing     Problem: SAFETY ADULT - FALL  Goal: Free from fall injury  Description: INTERVENTIONS:  - Assess pt frequently for physical needs  - Identify cognitive and p

## 2021-05-29 NOTE — PROGRESS NOTES
Picture taken on the rt lower leg scab w/ redness surrounding it but camera is broken, not able to print. Charge nurse aware.

## 2021-05-29 NOTE — PROGRESS NOTES
[facility] Hospitalist Progress Note                                                                   Sohan Craven  7/10/1931    SUBJECTIVE: No chest pain, palpitations, shortness of breath, c escitalopram  20 mg Oral Daily   • latanoprost  1 drop Both Eyes Nightly   • lisinopril  5 mg Oral Daily   • cetirizine  10 mg Oral Daily   • diphenhydrAMINE HCl  50 mg Intravenous Once   • MethylPREDNISolone Sodium Succ  40 mg Intravenous Q6H   • enoxapar

## 2021-05-29 NOTE — OCCUPATIONAL THERAPY NOTE
OT order received, chart reviewed. Pt with V/Q scan indeterminate for PE, to undergo CTA to r/o. Will f/u at a later date once results are available and after 24hrs of increased anticoagulation if + results per APTA guidelines.

## 2021-05-30 ENCOUNTER — APPOINTMENT (OUTPATIENT)
Dept: MRI IMAGING | Facility: HOSPITAL | Age: 86
DRG: 603 | End: 2021-05-30
Attending: PODIATRIST
Payer: MEDICARE

## 2021-05-30 PROCEDURE — 82962 GLUCOSE BLOOD TEST: CPT

## 2021-05-30 PROCEDURE — 97116 GAIT TRAINING THERAPY: CPT

## 2021-05-30 PROCEDURE — 97165 OT EVAL LOW COMPLEX 30 MIN: CPT

## 2021-05-30 PROCEDURE — 97140 MANUAL THERAPY 1/> REGIONS: CPT

## 2021-05-30 PROCEDURE — 85025 COMPLETE CBC W/AUTO DIFF WBC: CPT | Performed by: HOSPITALIST

## 2021-05-30 PROCEDURE — 73721 MRI JNT OF LWR EXTRE W/O DYE: CPT | Performed by: PODIATRIST

## 2021-05-30 PROCEDURE — 73718 MRI LOWER EXTREMITY W/O DYE: CPT | Performed by: PODIATRIST

## 2021-05-30 PROCEDURE — 97530 THERAPEUTIC ACTIVITIES: CPT

## 2021-05-30 PROCEDURE — 80048 BASIC METABOLIC PNL TOTAL CA: CPT | Performed by: HOSPITALIST

## 2021-05-30 PROCEDURE — 83735 ASSAY OF MAGNESIUM: CPT | Performed by: HOSPITALIST

## 2021-05-30 PROCEDURE — 97110 THERAPEUTIC EXERCISES: CPT

## 2021-05-30 PROCEDURE — 97162 PT EVAL MOD COMPLEX 30 MIN: CPT

## 2021-05-30 RX ORDER — ENOXAPARIN SODIUM 100 MG/ML
40 INJECTION SUBCUTANEOUS DAILY
Status: DISCONTINUED | OUTPATIENT
Start: 2021-05-31 | End: 2021-06-01

## 2021-05-30 NOTE — OCCUPATIONAL THERAPY NOTE
OCCUPATIONAL THERAPY EVALUATION - INPATIENT     Room Number: 2606/2606-A  Evaluation Date: 5/30/2021  Type of Evaluation: Initial  Presenting Problem: fever, LLE cellulitis    Physician Order: IP Consult to Occupational Therapy  Reason for Therapy: ADL/IAD Jerod   • REMOVAL GALLBLADDER     • SPECIAL SERVICE OR REPORT  OVER 10 YRS AGO    SLING   • TOTAL ABDOM HYSTERECTOMY         OCCUPATIONAL PROFILE    HOME SITUATION  Type of Home: House  Home Layout: Two level  Lives With: Alone (had caregiver, patient re Findings: None                ACTIVITY TOLERANCE                         O2 SATURATIONS       ACTIVITIES OF DAILY LIVING ASSESSMENT  AM-PAC ‘6-Clicks’ Inpatient Daily Activity Short Form  How much help from another person does the patient currently need… old  female admitted 5/27/2021 for LLE cellulitis. Complete medical history and occupational profile noted above. Functional outcome measures completed include: AM-PAC \"6 Clicks\".  In this OT evaluation patient presents with the following performance defi transfer to toilet with supervision    ---------------------------------------------------------------------------------------------------------  PPE worn by therapist this session: Surgical mask, goggles, gloves  PPE worn by patient this session: None

## 2021-05-30 NOTE — PROGRESS NOTES
Pharmacy Note: Renal dose adjustment   Alejandra Craven was originally prescribed 1 gm every 8 hours  which was renally dose adjusted at the time of the original order per P&T approved renal dosing protocol. Renal function has now improved.     Estimated Cre

## 2021-05-30 NOTE — PHYSICAL THERAPY NOTE
PHYSICAL THERAPY EVALUATION - INPATIENT     Room Number: 2606/2606-A  Evaluation Date: 5/30/2021  Type of Evaluation: Initial  Physician Order: PT Eval and Treat    Presenting Problem: Cellulitis on the L LE  Reason for Therapy: Mobility Dysfunction OR REPORT  OVER 10 YRS AGO    SLING   • TOTAL ABDOM HYSTERECTOMY         HOME SITUATION  Type of Home:  (From LTC for [facility])            Stairs to Bedroom: 0       Prior Level of [place]: Stated that she has not been OOB and has not been walking in [facility] railing?: Total       AM-PAC Score:  Raw Score: 14   Approx Degree of Impairment: 61.29%   Standardized Score (AM-PAC Scale): 38.1   CMS Modifier (G-Code): CL    FUNCTIONAL ABILITY STATUS  Gait Assessment   Gait Assistance:  Moderate assistance  Distance (f evaluation, patient's clinical presentation is stable and overall the evaluation complexity is considered moderate. These impairments and comorbidities manifest themselves as functional limitations in independent bed mobility, transfers, and gait.   The pa

## 2021-05-30 NOTE — PLAN OF CARE
Alert. Oriented x3. Periods of confusion at times. Easily reorientated. Cooperative. Follows commands. Sr per tele. Hr 80s. Denies pain at present. Made comfortable in bed tonight. Poc discussed with pt. Need reinforcement. Cont. to monitor pt.

## 2021-05-30 NOTE — PLAN OF CARE
Problem: SKIN/TISSUE INTEGRITY - ADULT  Right lower leg extremity, wounds open air, scabbed over, no drainage note. Slightly pink around edges. Denies pain in right lower extremity, skin precautions in progress. IV antibiotics for cellulitis right leg. and hemodynamic stability  Description: INTERVENTIONS:  - Monitor vital signs, rhythm, and trends  - Monitor for bleeding, hypotension and signs of decreased cardiac output  - Evaluate effectiveness of vasoactive medications to optimize hemodynamic stabili maintained within normal limits  Description: INTERVENTIONS:  - Monitor labs and rhythm and assess patient for signs and symptoms of electrolyte imbalances  - Administer electrolyte replacement as ordered  - Monitor response to electrolyte replacements, in stated pain goal  Description: INTERVENTIONS:  - Encourage pt to monitor pain and request assistance  - Assess pain using appropriate pain scale  - Administer analgesics based on type and severity of pain and evaluate response  - Implement non-pharmacologi

## 2021-05-30 NOTE — PLAN OF CARE
Dr. Edward Galvan, Podiatry notified of MRI results. Recommended short Cam-boot with weight bearing, ice therapy, Follow up with Podiatry as an outpatient after discharge.

## 2021-05-30 NOTE — PROGRESS NOTES
[facility] Hospitalist Progress Note                                                                   Professor Colvin [address]  7/10/1931    SUBJECTIVE:  Pt seen and examined.   She c/o LLE pain, worse in Daily   • Donepezil HCl  10 mg Oral Daily   • escitalopram  20 mg Oral Daily   • latanoprost  1 drop Both Eyes Nightly   • lisinopril  5 mg Oral Daily   • cetirizine  10 mg Oral Daily   • enoxaparin  1 mg/kg Subcutaneous 2 times per day     Continuous Infu

## 2021-05-31 PROCEDURE — 85025 COMPLETE CBC W/AUTO DIFF WBC: CPT | Performed by: INTERNAL MEDICINE

## 2021-05-31 PROCEDURE — 82962 GLUCOSE BLOOD TEST: CPT

## 2021-05-31 PROCEDURE — 80048 BASIC METABOLIC PNL TOTAL CA: CPT | Performed by: INTERNAL MEDICINE

## 2021-05-31 NOTE — PLAN OF CARE
Patient able to state year, president, and name correctly  Stating to date \"my birthday is [birth year] and its coming up\", location \"[place]\"  Repeating self, short term memory impairment- \"my daughter is in [place]" multiple times in one conversation  Pa administer replacement therapy as ordered  Outcome: Progressing     Problem: SKIN/TISSUE INTEGRITY - ADULT  Goal: Incision(s), wounds(s) or drain site(s) healing without S/S of infection  Description: INTERVENTIONS:  - Assess and document risk factors for

## 2021-05-31 NOTE — PLAN OF CARE
Received patient on bed, sleeping. Rousable. Denied chest pain, denied SOB but moans when turned/repositioned. Incontinent. Discussed POC. Safety measures reinforced, call light within reach. Bed alarm on. Needs attended to.  Went back to sleep after incont Care Plan goals for specific interventions  Outcome: Progressing  Goal: Patient/Family Short Term Goal  Description: Patient's Short Term Goal: To feel better and get out of hospital    Interventions:   - IV antibiotics    Physical Therapy    Podiatry cons schedule  Outcome: Progressing     Problem: CARDIOVASCULAR - ADULT  Goal: Maintains optimal cardiac output and hemodynamic stability  Description: INTERVENTIONS:  - Monitor vital signs, rhythm, and trends  - Monitor for bleeding, hypotension and signs of d Monitor labs and rhythm and assess patient for signs and symptoms of electrolyte imbalances  - Administer electrolyte replacement as ordered  - Monitor response to electrolyte replacements, including rhythm and repeat lab results as appropriate  - Fluid re

## 2021-05-31 NOTE — PLAN OF CARE
Problem: SKIN/TISSUE INTEGRITY - ADULT  Right leg , scabbed wounds, clean, no drainage,open to air. Denies pain to site with palpation. Left lower extremity swelling, greater than right. Denies pain today in the left  foot.    Goal: Incision(s), wounds(s) vital signs, rhythm, and trends  - Monitor for bleeding, hypotension and signs of decreased cardiac output  - Evaluate effectiveness of vasoactive medications to optimize hemodynamic stability  - Monitor arterial and/or venous puncture sites for bleeding a electrolyte replacements, including rhythm and repeat lab results as appropriate  - Fluid restriction as ordered  - Instruct patient on fluid and nutrition restrictions as appropriate  Outcome: Progressing  Goal: Hemodynamic stability and optimal renal fun for opioid side effects  - Notify MD/LIP if interventions unsuccessful or patient reports new pain  - Anticipate increased pain with activity and pre-medicate as appropriate  Outcome: Progressing     Problem: RISK FOR INFECTION - ADULT  Goal: Absence of fe

## 2021-05-31 NOTE — PROGRESS NOTES
[facility] Hospitalist Progress Note                                                                   Professor Colvin [address]  7/10/1931    SUBJECTIVE:  Pt seen and examined.   States her L foot still kylie Subcutaneous TID CC and HS   • docusate sodium  100 mg Oral BID   • gabapentin  300 mg Oral TID   • ALPRAZolam  0.25 mg Oral Daily   • anastrozole  1 mg Oral Daily   • Donepezil HCl  10 mg Oral Daily   • escitalopram  20 mg Oral Daily   • latanoprost  1 dr few hours and recheck ptt.  If gtt difficult to manage, lovenox may be an option for empiric tx.--> lovenox started  - CTA chest neg for PE but did show enlarged R paratracheal LN, will need outpt f/u   - change lovenox to prophy dose      3) diabetes- rece

## 2021-06-01 VITALS
SYSTOLIC BLOOD PRESSURE: 130 MMHG | BODY MASS INDEX: 29.64 KG/M2 | RESPIRATION RATE: 18 BRPM | TEMPERATURE: 98 F | WEIGHT: 157 LBS | HEART RATE: 94 BPM | OXYGEN SATURATION: 96 % | DIASTOLIC BLOOD PRESSURE: 66 MMHG | HEIGHT: 61 IN

## 2021-06-01 PROCEDURE — L4360 PNEUMAT WALKING BOOT PRE CST: HCPCS

## 2021-06-01 PROCEDURE — 97530 THERAPEUTIC ACTIVITIES: CPT

## 2021-06-01 PROCEDURE — 82962 GLUCOSE BLOOD TEST: CPT

## 2021-06-01 PROCEDURE — 97110 THERAPEUTIC EXERCISES: CPT

## 2021-06-01 RX ORDER — ALPRAZOLAM 0.25 MG/1
0.25 TABLET ORAL DAILY
Qty: 10 TABLET | Refills: 0 | Status: SHIPPED | OUTPATIENT
Start: 2021-06-01

## 2021-06-01 RX ORDER — ALPRAZOLAM 1 MG/1
1 TABLET ORAL NIGHTLY PRN
Qty: 10 TABLET | Refills: 0 | Status: SHIPPED | OUTPATIENT
Start: 2021-06-01

## 2021-06-01 RX ORDER — CEPHALEXIN 500 MG/1
500 CAPSULE ORAL EVERY 12 HOURS
Qty: 8 CAPSULE | Refills: 0 | Status: SHIPPED | COMMUNITY
Start: 2021-06-01

## 2021-06-01 RX ORDER — POLYETHYLENE GLYCOL 3350 17 G/17G
17 POWDER, FOR SOLUTION ORAL DAILY PRN
Qty: 10 EACH | Refills: 0 | Status: SHIPPED | COMMUNITY
Start: 2021-06-01

## 2021-06-01 RX ORDER — ALPRAZOLAM 0.25 MG/1
0.25 TABLET ORAL DAILY PRN
Qty: 10 TABLET | Refills: 0 | Status: SHIPPED | OUTPATIENT
Start: 2021-06-01

## 2021-06-01 RX ORDER — TRAMADOL HYDROCHLORIDE 50 MG/1
50 TABLET ORAL EVERY 6 HOURS PRN
Qty: 12 TABLET | Refills: 0 | Status: SHIPPED | OUTPATIENT
Start: 2021-06-01

## 2021-06-01 RX ORDER — PSEUDOEPHEDRINE HCL 30 MG
100 TABLET ORAL 2 TIMES DAILY
Qty: 30 CAPSULE | Refills: 0 | Status: SHIPPED | COMMUNITY
Start: 2021-06-01

## 2021-06-01 NOTE — CM/SW NOTE
FRANCISCO JAVIER spoke with Karina Lackey at [facility]: [phone] in regards to pt admitting to [facility] today. Karina Lackey confirmed that they can accept pt today. Updates were sent this morning.  Requested Karina Lackey to reach out to daughter to confirm/finalize the necessary pap

## 2021-06-01 NOTE — CM/SW NOTE
06/01/21 1624   Discharge disposition   Expected discharge disposition Skilled Nurs   Name of Facillity/Home Care/Hospice [facility]   Patient is Discharged to a [address] [place] [address] Yes   Discharge transportation [facility]       Pt stable to

## 2021-06-01 NOTE — PLAN OF CARE
Patient alert and cooperative, denies  any chest pain or chest discomfort , sinus on tele monitor , on room air , no acute distress noted, plan of care discussed, assisted to brp , tolerated well , ivabt continued , patient resting , will continue to mon interventions  Outcome: Progressing  Goal: Patient/Family Short Term Goal  Description: Patient's Short Term Goal: To feel better and get out of hospital    Interventions:   - IV antibiotics    Physical Therapy    Podiatry consult  - See additional Care Pl Problem: CARDIOVASCULAR - ADULT  Goal: Maintains optimal cardiac output and hemodynamic stability  Description: INTERVENTIONS:  - Monitor vital signs, rhythm, and trends  - Monitor for bleeding, hypotension and signs of decreased cardiac output  - Evalua assess patient for signs and symptoms of electrolyte imbalances  - Administer electrolyte replacement as ordered  - Monitor response to electrolyte replacements, including rhythm and repeat lab results as appropriate  - Fluid restriction as ordered  - Inst

## 2021-06-01 NOTE — CM/SW NOTE
Updates sent to [address] [place] [address] in [address].      AYLEEN Blackburn, [facility]   / Discharge Planner  [id number]

## 2021-06-01 NOTE — PROGRESS NOTES
Assumed care of patient at . Alert and oriented. Vital signs stable. Sinus rhythm on telemetry. Denies pain. Patient was comfortable. Will cont to monitorr.

## 2021-06-01 NOTE — DISCHARGE SUMMARY
General Medicine Discharge Summary     Patient ID:  Roberth Craven  80year old  7/10/1931    Admit date: 5/27/2021     Discharge date and time: 6/1/2021    Attending Physician: Taylor Sparks DO ankle neg. LLE venous doppler neg on 5/27.    - PT recommending ETHAN  - appreciate podiatry c/s, MRI L foot+ankle noted, recommend short CAM boot, ice, elevation.    CAM boot placed prior to ETHAN discharge.       5) breast cancer- on arimidex    Stable for d 641015 UNIT/GM External Powder  Apply topically 2 (two) times daily. Apply to the affected groin area 0900 am and 2100 pm    acetaminophen 325 MG Oral Tab  Take 650 mg by mouth every 6 (six) hours as needed for Pain (1-3).  Not to exceed 3 Grams of total AP

## 2021-06-01 NOTE — PLAN OF CARE
Problem: SKIN/TISSUE INTEGRITY - ADULT  Right lower extremity, healing scabbed , non-draining wounds   Denies pain in right leg, reports improvement in pain in left foot.   Goal: Incision(s), wounds(s) or drain site(s) healing without S/S of infection  IV hemodynamic stability  Description: INTERVENTIONS:  - Monitor vital signs, rhythm, and trends  - Monitor for bleeding, hypotension and signs of decreased cardiac output  - Evaluate effectiveness of vasoactive medications to optimize hemodynamic stability monitoring before meals and at bedtime  Good appetite  Goal: Electrolytes maintained within normal limits  K replacement in progress  Description: INTERVENTIONS:  - Monitor labs and rhythm and assess patient for signs and symptoms of electrolyte imbalances INTERVENTIONS:  - Encourage pt to monitor pain and request assistance  - Assess pain using appropriate pain scale  - Administer analgesics based on type and severity of pain and evaluate response  - Implement non-pharmacological measures as appropriate and

## 2021-06-01 NOTE — PROGRESS NOTES
[facility] Hospitalist Progress Note                                                                   Professor Colvin [address]  7/10/1931    SUBJECTIVE:  Pt seen and examined.   Had episode of incontinence and HS   • docusate sodium  100 mg Oral BID   • gabapentin  300 mg Oral TID   • ALPRAZolam  0.25 mg Oral Daily   • anastrozole  1 mg Oral Daily   • Donepezil HCl  10 mg Oral Daily   • escitalopram  20 mg Oral Daily   • latanoprost  1 drop Both Eyes Nightly ptt has been very high in 200s even on recheck so will hold for a few hours and recheck ptt.  If gtt difficult to manage, lovenox may be an option for empiric tx.--> lovenox started  - CTA chest neg for PE but did show enlarged R paratracheal LN, will need

## 2021-06-01 NOTE — PLAN OF CARE
Report called to Nurse receiving patient at [facility] rehab. Plan of care updated patient.

## 2021-06-01 NOTE — PHYSICAL THERAPY NOTE
PHYSICAL THERAPY TREATMENT NOTE - INPATIENT    Room Number: 2606/2606-A     Session: 1   Number of Visits to Meet Established Goals: 5    Presenting Problem: Cellulitis on the L LE     History related to current admission:   PCP: Yanna Sterling MD feeling good today\"    Patient’s self-stated goal is to be able to figure out where to live and what options are. OBJECTIVE  Precautions: Limb alert - left;Bed/chair alarm; Other (Comment) (L CAM boot)    WEIGHT BEARING RESTRICTION  Weight Bearing Restr sitting in [facility] with L CAM boot in place. Pt agreeable to therapy with encouragement. Pt educated on goals and course of PT session. Pt instructed in and completed seated B LE ex to increase B LE strength and circulation.   Pt required rest breaks between training  Rehab Potential : Good  Frequency (Obs): 3x/week    CURRENT GOALS     Goal #1 Patient is able to demonstrate supine - sit EOB @ level: supervision      Goal #2 Patient is able to demonstrate transfers Sit to/from Stand at assistance level: minimu

## 2022-04-23 NOTE — PROGRESS NOTES
Piotr Army Merissa Patient Status:  Inpatient    7/10/1931 MRN [id number]   [facility] 4SW-A Attending Gilberto Najjar, MD   Hosp Day # 2 PCP Risa Smith MD     Critical Care Progress Note      Assessment/Plan:  1.  DKA  - Insulin rashid (37 °C) (Temporal)   Resp 21   Ht 5' 3\" (1.6 m)   Wt 172 lb 9.9 oz (78.3 kg)   SpO2 91%   BMI 30.58 kg/m²   General: No distress  Skin: No rashes, no bruising  Eyes: No icterus   Ears, Nose, Throat, Mouth: OP clear, MMM  Neck: Supple, no adenopathy or jane Chest pain Pt temp 102.2, HR:105 MRI UA not sent yet as pt discarded urine multiple attempts to contact team 8

## (undated) DEVICE — 3M™ RED DOT™ MONITORING ELECTRODE WITH FOAM TAPE AND STICKY GEL, 50/BAG, 20/CASE, 72/PLT 2570: Brand: RED DOT™

## (undated) DEVICE — FILTERLINE NASAL ADULT O2/CO2

## (undated) DEVICE — 1200CC GUARDIAN II: Brand: GUARDIAN

## (undated) DEVICE — Device: Brand: DEFENDO AIR/WATER/SUCTION AND BIOPSY VALVE

## (undated) DEVICE — POM MASK W/CO2

## (undated) DEVICE — ENDOSCOPY PACK UPPER: Brand: MEDLINE INDUSTRIES, INC.

## (undated) NOTE — ED AVS SNAPSHOT
Edgar Jones   MRN: [id number]    Department:  [facility] Emergency Department   Date of Visit:  3/9/2020           Disclosure     Insurance plans vary and the physician(s) referred by the ER may not be covered by your plan.  Please contact your tell this physician (or your personal doctor if your instructions are to return to your personal doctor) about any new or lasting problems. The primary care or specialist physician will see patients referred from the [facility] Emergency Department.  Arthor Bernheim

## (undated) NOTE — LETTER
Mirian Goncalves [address]  [address] [facility] [facility] [address] [facility]  Authorization for Surgical Operation and Procedure     Date:___________                                                                                                         Time:__________ occur: fever and allergic reactions, hemolytic reactions, transmission of diseases such as Hepatitis, AIDS and Cytomegalovirus (CMV) and fluid overload.   In the event that I wish to have an autologous transfusion of my own blood, or a directed donor transf applicable recovery period ends for purposes of reinstating the DNAR order.   10. Patients having a sterilization procedure: I understand that if the procedure is successful the results will be permanent and it will therefore be impossible for me to insemin medicine and do additional procedures as necessary. Some examples are: Starting or using an “IV” to give me medicine, fluids or blood during my procedure, and having a breathing tube placed to help me breathe when I’m asleep (intubation).  In the event that but potential complications include headache, bleeding, infection, seizure, irregular heart rhythms, and nerve injury.     I can change my mind about having anesthesia services at any time before I get the medicine.    ______________________________________

## (undated) NOTE — IP AVS SNAPSHOT
Patient Demographics     Address  [place] [address] Phone  [phone] [facility]) *Preferred*  [phone] [facility]) E-mail Address  [email or web address]      Emergency Contact(s)     Name Relation Home Work [address] ALPRAZolam 0.25 MG Tabs  Commonly known as: XANAX      Take 1 tablet (0.25 mg total) by mouth daily as needed for Anxiety.    Vini Mckeono, DO         anastrozole 1 MG Tabs tab  Commonly known as: ARIMIDEX  Next dose due: 6/2/2021      Take 1 mg b Ana Perez MD         metFORMIN HCl 1000 MG Tabs  Commonly known as: GLUCOPHAGE  Next dose due: 6/2/2021      Take 1 tablet (1,000 mg total) by mouth daily with breakfast.   Ana Perez MD         Nystatin [id number] UNIT/GM Powd  Next d [id number] docusate sodium (COLACE) cap 100 mg 05/31/21 2200 Given      [id number] docusate sodium (COLACE) cap 100 mg 06/01/21 0942 Given      [id number] escitalopram (LEXAPRO) tab 20 mg 06/01/21 0942 Given      [id number] gabapentin (NEURONTIN) cap 300 mg 05 Result No Growth 4 Days    Blood Culture FREQ X 2 [[id number]] Collected: 05/28/21 1121    Order Status: Completed Lab Status: Preliminary result Updated: 06/01/21 1200    Specimen: Blood,peripheral      Blood Culture Result No Growth 4 Days    Rapid SARS-C ANXIETY    • Anxiety    • Anxiety state, unspecified     released form her psychiatrist. on life long daily lexapro/Ativan.     • Breast cancer ([facility] [address] 03/2019    right breast IDC-Excisional biopsy prohibitively morbid, observation recommended   • bronchospas every 6 (six) hours as needed for Nausea., Disp: , Rfl:   ALPRAZolam 0.25 MG Oral Tab, Take 0.25 mg by mouth daily. At 0900, Disp: , Rfl:   ALPRAZolam 0.25 MG Oral Tab, Take 0.25 mg by mouth as needed. , Disp: , Rfl:   Nystatin [id number] UNIT/GM External Powde (Other) Sister    • Other (Other) Brother         ALZHEIMERS   • Heart Disease Neg    • Stroke Neg[RJ.2]        Review of Systems  Comprehensive ROS reviewed and negative except for what's stated above.        OBJECTIVE:[RJ.1]  BP (!) 127/102 (BP Location: Common, deep, and superficial femoral, popliteal, sapheno-femoral junction, posterior tibial veins, and the contralateral common femoral vein. PATIENT STATED HISTORY: (As transcribed by Technologist)  Patient offered no additional history at this time. report was sent to the emergency department. The report was reviewed prior to this dictation. TECHNIQUE:  The patient was ventilated with 42.0 mCi Tc-99m DTPA in the nebulizer, and supine images of the lungs were obtained in the 6 standard projections. 5) breast cancer- on arimidex    SCDs if possible, heparin YWL[AX.0]      **Certification      PHYSICIAN Certification of Need for Inpatient Hospitalization -[DY.1] Initial Certification[RJ.3]    Patient will require inpatient services that will reasona Hyperglycemia    Hypoxia    Cellulitis of right lower extremity[MK. 2]      Past Medical History[MK.1]  Past Medical History:   Diagnosis Date   • ANXIETY    • Anxiety    • Anxiety state, unspecified     released form her psychiatrist. on life long daily le Static Sitting: Good  Dynamic Sitting: Good           Static Standing: Fair - (with RW and L CAM boot)  Dynamic Standing: Poor + (with RW and L CAM boot)[MK. 2]    ACTIVITY TOLERANCE                         O2 WALK       AM-PAC '6-Clicks' INPATIENT and verbal cues for hand placement. Pt given therapeutic rest break in sitting. Pt second sit to stand attempt from [facility] to RW with min assist. Pt able to tolerate standing x 2 mins prior to having to sit with CGA.   Pt educated on benefits of amb, mobili donned by therapist and worn throughout tx: mask, gloves, and goggles. [MK.1]       Attribution Salazar    [address] - Gil Indio, PT on 6/1/2021  3:44 PM  [address] - Gil Indio, PT on 6/1/2021  3:47 PM               Physical Therapy Note signed by Britney Perez Hyperlipidemia    • HYPERTENSION    • Lymph node disorder     left iliac LN   • Type II or unspecified type diabetes mellitus without mention of complication, not stated as uncontrolled    • Unspecified essential hypertension    • Urinary incontinence[TF.3 Poor +[TF.3]    ADDITIONAL TESTS           NEUROLOGICAL FINDINGS           ACTIVITY TOLERANCE[TF.1]; Fair[TF.2]        O2 WALK       AM-PAC '6-Clicks' INPATIENT SHORT FORM - BASIC MOBILITY  How much difficulty does the patient currently have. ..[TF.1]  - Transfer training[TF.2]    Patient End of Session: Up in chair;Needs met;Call light within reach;RN aware of session/findings; All patient questions and concerns addressed; Alarm set[TF.3]    ASSESSMENT   Patient is a[TF.3 80year old[TF.3] female admitted #3 Patient is able to ambulate[TF.1] 25[TF.2] feet with assist device:[TF.1] walker - rolling[TF.2] at assistance level:[TF.1] minimum assistance[TF.2]     Goal #4    Goal #5    Goal #6    Goal Comments: Goals established on 5/30/2021[TF.1]     Attribution Breast cancer ([facility] 03/2019    right breast IDC-Excisional biopsy prohibitively morbid, observation recommended   • bronchospasm 2010    normal PFT.  no evidence of asthma   • Cancer ([facility]     right breast cancer 1/2019   • H/O chronic bronchitis    • H/O: PAIN ASSESSMENT             COGNITION  Overall Cognitive Status:  Impaired  Attention Span:  attends with cues to redirect  Following Commands:  follows one-step commands inconsistently, follows one step commands with increased time and follows one step c assist with setup for front pericare; sitting EOB to L side via min assist with increased time and heavy use of bedrail; sitting EOB several minutes with SBA, total assist provided to don B socks this session, will further address LB dressing future sessio deficits    Client Assessment/Performance Deficits LOW - No comorbidities nor modifications of tasks    Clinical Decision Making LOW - Analysis of occupational profile, problem-focused assessments, limited treatment options    Overall Complexity LOW     OT Pneumovax 23 09/21/16     Pneumovax 23 11/03/11     TDAP 07/04/15     Vitamin B-12 Up To 1000mcg, IM/SC Inj 01/21/21       Multidisciplinary Problems     Active Goals        Problem: Patient/Family Goals    Goal Priority Disciplines Outcome Interventions

## (undated) NOTE — ED AVS SNAPSHOT
THE [facility] Emergency Department in [address] [facility]    Phone:  [phone]    Fax:  Via [address] ELIEZER Craven   MRN: [id number]    Department:  THE [facility] Emergency Department in [place]   Date of Visit: You can get these medications from any pharmacy     Bring a paper prescription for each of these medications    - Albuterol Sulfate  (90 Base) MCG/ACT Aers              Discharge Instructions       Sure and use her Flovent and Flonase as well.   Funmilayo primary care or specialist physician will see patients referred from the [facility] Emergency Department. Follow-up care is at the discretion of that Physician.     IF THERE IS ANY CHANGE OR WORSENING OF YOUR CONDITION, CALL YOUR PRIMARY CARE PHYSICIAN harming yourself, contact [facility] at [phone]. - If you don’t have insurance, Jan Keller has partnered with Patient [address] to help you get signed up for insurance coverage.   Patient [place] hemorrhage or mass lesion. There is   stable mild atrophy and white matter disease, without significant interval change. SINUSES:           No sign of acute sinusitis. MASTOIDS:          No sign of acute inflammation.   SKULL:             No evidence f office, you can view your past visit information in [employer] by going to Visits < Visit Summaries. [employer] questions? Call [phone] for help. [employer] is NOT to be used for urgent needs. For medical emergencies, dial 911.

## (undated) NOTE — IP AVS SNAPSHOT
Patient Demographics     Address  [address] [place] [address] [place] [address] Phone  [phone] [facility]) *Preferred*  [phone] [facility]) E-mail Address  [email or web address]      Emergency Contact(s)     Name Relation Home Work [address] latanoprost 0.005 % Soln  Commonly known as: XALATAN      Place 1 drop into both eyes nightly. lisinopril 2.5 MG Tabs      Take 2 tablets (5 mg total) by mouth daily.    Zhen Zurita MD         loratadine 10 MG Tabs  Commonly known as: CLAR Given            RIGHT UPPER ABDOMEN     Order ID Medication Name Action Time Action Reason Comments    [id number] Heparin Sodium (Porcine) 5000 UNIT/ML injection 5,000 Units 04/24/21 0526 Given              Recent Vital Signs       Most Recent Value   Tangela Filed: 4/13/2021  4:06 PM Date of Service: 4/13/2021  3:14 PM Status: Signed    : Jesus Santana DO (Physician)       [facility] History and Physical      Patient presents with:  Fall       PCP: Lauri Argueta MD      History of Pre Surgical History:   Procedure Laterality Date   • CHOLECYSTECTOMY     • COLONOSCOPY  2008   • HYSTERECTOMY     • NEEDLE BIOPSY RIGHT  03/2019    IDC   • OTHER SURGICAL HISTORY      bladder raised   • OTHER SURGICAL HISTORY  8-5-2011    Cysto / Flow us -  HPI.      OBJECTIVE:  BP (!) 155/97   Pulse 108   Temp 99.6 °F (37.6 °C) (Tympanic)   Resp (!) 28   Ht 5' 3\" (1.6 m)   Wt 165 lb 5.5 oz (75 kg)   SpO2 92%   BMI 29.29 kg/m²   Gen:[MS.1] Oriented to persona, not place or time. Elderly. [MS.2]    HEENT:  EOM fracture. There is an enthesophyte at the plantar surface of the calcaneus.    Dictated by (CST): Bertram Thomas MD on 3/16/2021 at 3:20 PM     Finalized by (CST): Bertram Thomas MD on 3/16/2021 at 3:21 PM       XR ANKLE (MIN 3 VIEWS), RIGHT (CPT=73610)    R subcortical microvascular disease noted. No bleed or mass effect. Basal cisterns are intact. There is no extra cerebral collection. A fracture is not identified. The  sinuses are clear. CONCLUSION:  No acute process.    Dictated by (CST): Gurvinder Magallon bed and bed rail. right leg pinned agaisnt rail, pressure ulcer noted  TECHNIQUE:  Noncontrast CT scanning of the cervical spine is performed from the skull base through C7. Multiplanar reconstructions are generated. Dose reduction techniques were used. PARASPINAL AREA:  Normal with no visible mass. BONES:  No fracture, pars defect, or osseous lesion. CERVICAL DISC LEVELS: C2-C3:  Bilateral uncovertebral joint osteophytes and facet hypertrophy are noted without stenosis of central canal or foramina.  C3- diffuse degenerative disc changes of the thoracic spine. Mild to moderate bilateral shoulder arthropathy is also noted, greater on the right relative to the left. CONCLUSION:  1. Lungs are clear with hyperaeration, suspicious for emphysema.   Pl Finalized by (CST): Sidney Galo DO on 4/13/2021 at 1:12 PM          Assessment/Plan:[MS.1]     89[MS.2] female with PMH sig for[MS.1] dementia, HTN, DM II, breast CA, and anxiety who presented to the ED after being found immobile in bed.       # Diabe Gilford Jump Raftis Patient Status:  Inpatient    7/10/1931 MRN [id number]   [facility] 4NW-A Attending Beatriz Alegre MD   Hosp Day # 8 PCP Burak Villegas MD     Gilford Jump Raftis is a 80year old female for N V dry heaves consult.   Pt Type II or unspecified type diabetes mellitus without mention of complication, not stated as uncontrolled    • Unspecified essential hypertension    • Urinary incontinence       Past Surgical History:   Procedure Laterality Date   • CHOLECYSTECTOMY     • C as she is currently tolerating po.    1.  Follow symptoms. 2.  Cont current meds diet. 3.  Reassess symptoms tomorrow. Total time spent on patient care:  32 minutes    cc: [MF.1] No ref.  provider found[MF.2]    The patient indicates understanding o PCP within   - Consults:     Mariah Hall MD  [address] [address] [phone]    In 1 week      - Labs: none  - Radiology: none    Hospital Course:      89 female with PMH sig for dementia, HTN, DM II, breast C Anxiety. anastrozole 1 MG Oral Tab tab  Take 1 mg by mouth daily. escitalopram 20 MG Oral Tab  Take 20 mg by mouth daily. Fluticasone Propionate 50 MCG/ACT Nasal Suspension  1 spray by Each Nare route daily.     metFORMIN HCl 1000 MG Oral Tab  Take Goals: 5    Presenting Problem: fall, DKA, UTI    Problem List  Principal Problem:    Diabetic ketoacidosis without coma associated with other specified diabetes mellitus ([address]  Active Problems:    Diabetic acidosis without coma ([facility] [address]    Non-traumatic rhab Location: no c/o  Management Techniques: Breathing techniques; Body mechanics; Activity promotion;Relaxation;Repositioning    BALANCE                                                                                                                     Static S bed, mod A x 2, for weight shift to offload and for RW management. Stand>sit min A for eccentric control. Sit>stand mod A x 2, pt gait trained to BS chair c mod A x 2 and RW.   After seated rest, pt performed sit>stand c min A x 2 and standing balance prog with assist device: walker - rolling at assistance level: moderate assistance      Goal #4     Goal #5     Goal #6     Goal Comments: Goals established on 4/15/2021; goals in progress 4/22/2021[CY. 1]        Attribution Salazar    CY. 1 - Yasmeen Blood, PT asthma   • Cancer [facility])     right breast cancer 1/2019   • H/O chronic bronchitis    • H/O: pneumonia    • Hx of diseases NEC     GLAUCOMA AND POSS ULCER   • HYPERLIPIDEMIA    • Hyperlipidemia    • HYPERTENSION    • Lymph node disorder     left iliac LN ASSESSMENT  Supine to Sit : Minimum assistance  Sit to Stand: Maximum assistance    Skilled Therapy Provided: Pt received supine in bed for session.      Transfers  Supine to EOB: min assist  Sit to Stand - EOB: max assist x2  Sit to Stand - Chair: max assi education;Equipment eval/education; Compensatory technique education  Rehab Potential : Fair  Frequency (Obs): 3-5x/week      OT Goals:   ADL GOALS:  Patient will perform lower body dressing w/ min A and with adaptive equipment PRN  Patient will perform ileana without coma ([facility] [address]    Non-traumatic rhabdomyolysis    Leukocytosis, unspecified type    ROSA (acute kidney injury) ([facility] [address]    Sepsis secondary to UTI [facility])    Goals of care, counseling/discussion    Palliative care encounter      Past Medical History  Past Me Method of Presentation: Teaspoon;Cup;Straw  Oral Phase of Swallow (VFSS - Thin Liquids):  (premature spillage)  Triggered at: Pyriform sinuses  Premature Spillage to: Pyriform sinuses  Delay (seconds):  (2)  Residue Severity, Location: Mild;Valleculae;Pyri functional epiglottic inversion/recoil. Mild BOT, vallecular and pyriform sinus residue after the swallow partially cleared via dry swallow and liquid wash.   Chin tuck posture did minimize penetration, however pt demonstrated poor understanding and implem Pathologist    Filed: 4/22/2021  9:42 AM Date of Service: 4/22/2021  8:15 AM Status: Signed    : Trae Pereyra, SLP (Speech and Language Pathologist)       SPEECH DAILY NOTE - INPATIENT    ASSESSMENT & PLAN   ASSESSMENT  Pt seen for dysphagia tx to a aspiration with 90 % accuracy over 1-2 session(s). Goal Met   Goal #3 The patient/family/caregiver will demonstrate understanding and implementation of aspiration precautions and swallow strategies independently over 1-2 session(s).   Goal Met   Goal #4 Patient's Long Term Goal: Go home    Interventions:  - discharge planning  - See additional Care Plan goals for specific interventions   Patient/Family Short Term Goal   (Resolved)     Interdisciplinary Completed    Description: Patient's Short Term Goal:

## (undated) NOTE — ED AVS SNAPSHOT
THE [facility] Emergency Department in [address] [facility]    Phone:  [phone]    Fax:  Via [address] ELIEZRE Craven   MRN: [id number]    Department:  THE [facility] Emergency Department in [place]   Date of Visit: IF THERE IS ANY CHANGE OR WORSENING OF YOUR CONDITION, CALL YOUR PRIMARY CARE PHYSICIAN AT ONCE OR RETURN IMMEDIATELY TO THE EMERGENCY DEPARTMENT.     If you have been prescribed any medication(s), please fill your prescription right away and begin taking t

## (undated) NOTE — ED AVS SNAPSHOT
Rollie Peabody Raftis   MRN: [id number]    Department:  THE [facility] Emergency Department in [place]   Date of Visit:  3/8/2020           Disclosure     Insurance plans vary and the physician(s) referred by the ER may not be covered by your plan.  Please contact tell this physician (or your personal doctor if your instructions are to return to your personal doctor) about any new or lasting problems. The primary care or specialist physician will see patients referred from the [facility] Emergency Department.  Edilma Whitehead

## (undated) NOTE — IP AVS SNAPSHOT
[address]            (For Outpatient Use Only) Initial Admit Date: 5/27/2021   Inpt/Obs Admit Date: Inpt: 5/28/21 / Obs: N/A   Discharge Date:    Crystal Warner:  [de-identified]   MRN: [de-identified]   CSN: [id number]   CEID: [id number] [employer]   Payor:  Plan:    Group Number:  Insurance Type:    Subscriber Name:  Subscriber :    Subscriber ID:  Pt Rel to Subscriber:    Hospital Account Financial Class: Medicare    2021